# Patient Record
Sex: FEMALE | Race: WHITE | URBAN - METROPOLITAN AREA
[De-identification: names, ages, dates, MRNs, and addresses within clinical notes are randomized per-mention and may not be internally consistent; named-entity substitution may affect disease eponyms.]

---

## 2023-08-07 ENCOUNTER — OFFICE VISIT (OUTPATIENT)
Dept: INTERNAL MEDICINE CLINIC | Facility: CLINIC | Age: 58
End: 2023-08-07

## 2023-08-07 VITALS
SYSTOLIC BLOOD PRESSURE: 109 MMHG | DIASTOLIC BLOOD PRESSURE: 70 MMHG | WEIGHT: 216 LBS | BODY MASS INDEX: 37.8 KG/M2 | HEIGHT: 63.3 IN | HEART RATE: 71 BPM

## 2023-08-07 DIAGNOSIS — E11.9 TYPE 2 DIABETES MELLITUS WITHOUT COMPLICATION, WITHOUT LONG-TERM CURRENT USE OF INSULIN (HCC): ICD-10-CM

## 2023-08-07 DIAGNOSIS — E78.2 MIXED HYPERLIPIDEMIA: ICD-10-CM

## 2023-08-07 DIAGNOSIS — I10 PRIMARY HYPERTENSION: Primary | ICD-10-CM

## 2023-08-07 PROBLEM — E78.5 DYSLIPIDEMIA: Status: ACTIVE | Noted: 2020-10-16

## 2023-08-07 PROCEDURE — 3008F BODY MASS INDEX DOCD: CPT | Performed by: INTERNAL MEDICINE

## 2023-08-07 PROCEDURE — 99204 OFFICE O/P NEW MOD 45 MIN: CPT | Performed by: INTERNAL MEDICINE

## 2023-08-07 PROCEDURE — 3078F DIAST BP <80 MM HG: CPT | Performed by: INTERNAL MEDICINE

## 2023-08-07 PROCEDURE — 3074F SYST BP LT 130 MM HG: CPT | Performed by: INTERNAL MEDICINE

## 2023-08-07 RX ORDER — EMPAGLIFLOZIN 25 MG/1
TABLET, FILM COATED ORAL
COMMUNITY
Start: 2022-08-10

## 2023-08-07 RX ORDER — VALSARTAN AND HYDROCHLOROTHIAZIDE 80; 12.5 MG/1; MG/1
1 TABLET, FILM COATED ORAL DAILY
COMMUNITY
Start: 2023-05-30

## 2023-08-07 RX ORDER — GLIMEPIRIDE 4 MG/1
TABLET ORAL
COMMUNITY
Start: 2022-07-25 | End: 2023-08-07

## 2023-08-07 RX ORDER — DULAGLUTIDE 1.5 MG/.5ML
1.5 INJECTION, SOLUTION SUBCUTANEOUS WEEKLY
COMMUNITY
Start: 2023-05-15 | End: 2023-08-07

## 2023-08-07 RX ORDER — ROSUVASTATIN CALCIUM 20 MG/1
20 TABLET, COATED ORAL NIGHTLY
COMMUNITY

## 2023-08-08 ENCOUNTER — LAB ENCOUNTER (OUTPATIENT)
Dept: LAB | Age: 58
End: 2023-08-08
Attending: INTERNAL MEDICINE
Payer: COMMERCIAL

## 2023-08-08 DIAGNOSIS — I10 PRIMARY HYPERTENSION: ICD-10-CM

## 2023-08-08 DIAGNOSIS — E78.2 MIXED HYPERLIPIDEMIA: ICD-10-CM

## 2023-08-08 DIAGNOSIS — E11.9 TYPE 2 DIABETES MELLITUS WITHOUT COMPLICATION, WITHOUT LONG-TERM CURRENT USE OF INSULIN (HCC): ICD-10-CM

## 2023-08-08 LAB
ALBUMIN SERPL-MCNC: 4 G/DL (ref 3.4–5)
ALBUMIN/GLOB SERPL: 1.2 {RATIO} (ref 1–2)
ALP LIVER SERPL-CCNC: 79 U/L
ALT SERPL-CCNC: 33 U/L
ANION GAP SERPL CALC-SCNC: 11 MMOL/L (ref 0–18)
AST SERPL-CCNC: 13 U/L (ref 15–37)
BILIRUB SERPL-MCNC: 0.6 MG/DL (ref 0.1–2)
BUN BLD-MCNC: 19 MG/DL (ref 7–18)
BUN/CREAT SERPL: 22.6 (ref 10–20)
CALCIUM BLD-MCNC: 9.6 MG/DL (ref 8.5–10.1)
CHLORIDE SERPL-SCNC: 104 MMOL/L (ref 98–112)
CHOLEST SERPL-MCNC: 157 MG/DL (ref ?–200)
CO2 SERPL-SCNC: 22 MMOL/L (ref 21–32)
CREAT BLD-MCNC: 0.84 MG/DL
CREAT UR-SCNC: 24.9 MG/DL
DEPRECATED RDW RBC AUTO: 44.8 FL (ref 35.1–46.3)
EGFRCR SERPLBLD CKD-EPI 2021: 80 ML/MIN/1.73M2 (ref 60–?)
ERYTHROCYTE [DISTWIDTH] IN BLOOD BY AUTOMATED COUNT: 14.1 % (ref 11–15)
EST. AVERAGE GLUCOSE BLD GHB EST-MCNC: 189 MG/DL (ref 68–126)
FASTING PATIENT LIPID ANSWER: YES
FASTING STATUS PATIENT QL REPORTED: YES
GLOBULIN PLAS-MCNC: 3.3 G/DL (ref 2.8–4.4)
GLUCOSE BLD-MCNC: 218 MG/DL (ref 70–99)
HBA1C MFR BLD: 8.2 % (ref ?–5.7)
HCT VFR BLD AUTO: 44.3 %
HDLC SERPL-MCNC: 55 MG/DL (ref 40–59)
HGB BLD-MCNC: 15.3 G/DL
LDLC SERPL CALC-MCNC: 65 MG/DL (ref ?–100)
MCH RBC QN AUTO: 30.3 PG (ref 26–34)
MCHC RBC AUTO-ENTMCNC: 34.5 G/DL (ref 31–37)
MCV RBC AUTO: 87.7 FL
MICROALBUMIN UR-MCNC: 0.79 MG/DL
MICROALBUMIN/CREAT 24H UR-RTO: 31.7 UG/MG (ref ?–30)
NONHDLC SERPL-MCNC: 102 MG/DL (ref ?–130)
OSMOLALITY SERPL CALC.SUM OF ELEC: 293 MOSM/KG (ref 275–295)
PLATELET # BLD AUTO: 189 10(3)UL (ref 150–450)
POTASSIUM SERPL-SCNC: 4 MMOL/L (ref 3.5–5.1)
PROT SERPL-MCNC: 7.3 G/DL (ref 6.4–8.2)
RBC # BLD AUTO: 5.05 X10(6)UL
SODIUM SERPL-SCNC: 137 MMOL/L (ref 136–145)
TRIGL SERPL-MCNC: 230 MG/DL (ref 30–149)
TSI SER-ACNC: 1.4 MIU/ML (ref 0.36–3.74)
VLDLC SERPL CALC-MCNC: 35 MG/DL (ref 0–30)
WBC # BLD AUTO: 6 X10(3) UL (ref 4–11)

## 2023-08-08 PROCEDURE — 82570 ASSAY OF URINE CREATININE: CPT

## 2023-08-08 PROCEDURE — 80061 LIPID PANEL: CPT

## 2023-08-08 PROCEDURE — 84443 ASSAY THYROID STIM HORMONE: CPT

## 2023-08-08 PROCEDURE — 83036 HEMOGLOBIN GLYCOSYLATED A1C: CPT

## 2023-08-08 PROCEDURE — 80053 COMPREHEN METABOLIC PANEL: CPT

## 2023-08-08 PROCEDURE — 3060F POS MICROALBUMINURIA REV: CPT | Performed by: INTERNAL MEDICINE

## 2023-08-08 PROCEDURE — 36415 COLL VENOUS BLD VENIPUNCTURE: CPT

## 2023-08-08 PROCEDURE — 85027 COMPLETE CBC AUTOMATED: CPT

## 2023-08-08 PROCEDURE — 3052F HG A1C>EQUAL 8.0%<EQUAL 9.0%: CPT | Performed by: INTERNAL MEDICINE

## 2023-08-08 PROCEDURE — 82043 UR ALBUMIN QUANTITATIVE: CPT

## 2023-08-08 PROCEDURE — 3061F NEG MICROALBUMINURIA REV: CPT | Performed by: INTERNAL MEDICINE

## 2023-08-10 DIAGNOSIS — E11.9 TYPE 2 DIABETES MELLITUS WITHOUT COMPLICATION, WITHOUT LONG-TERM CURRENT USE OF INSULIN (HCC): Primary | ICD-10-CM

## 2023-10-18 ENCOUNTER — OFFICE VISIT (OUTPATIENT)
Dept: INTERNAL MEDICINE CLINIC | Facility: CLINIC | Age: 58
End: 2023-10-18

## 2023-10-18 VITALS
SYSTOLIC BLOOD PRESSURE: 103 MMHG | HEART RATE: 77 BPM | HEIGHT: 63.3 IN | DIASTOLIC BLOOD PRESSURE: 69 MMHG | RESPIRATION RATE: 18 BRPM | WEIGHT: 212.38 LBS | BODY MASS INDEX: 37.16 KG/M2

## 2023-10-18 DIAGNOSIS — Z12.31 SCREENING MAMMOGRAM, ENCOUNTER FOR: ICD-10-CM

## 2023-10-18 DIAGNOSIS — E11.9 DIABETES MELLITUS TYPE 2, NONINSULIN DEPENDENT (HCC): ICD-10-CM

## 2023-10-18 DIAGNOSIS — Z00.00 PHYSICAL EXAM, ANNUAL: Primary | ICD-10-CM

## 2023-10-18 PROCEDURE — 3078F DIAST BP <80 MM HG: CPT | Performed by: INTERNAL MEDICINE

## 2023-10-18 PROCEDURE — 99396 PREV VISIT EST AGE 40-64: CPT | Performed by: INTERNAL MEDICINE

## 2023-10-18 PROCEDURE — 3074F SYST BP LT 130 MM HG: CPT | Performed by: INTERNAL MEDICINE

## 2023-10-18 PROCEDURE — 3008F BODY MASS INDEX DOCD: CPT | Performed by: INTERNAL MEDICINE

## 2023-10-24 RX ORDER — EMPAGLIFLOZIN 25 MG/1
25 TABLET, FILM COATED ORAL DAILY
Qty: 90 TABLET | Refills: 1 | Status: SHIPPED | OUTPATIENT
Start: 2023-10-24

## 2023-10-24 NOTE — TELEPHONE ENCOUNTER
Please review; protocol failed. Requested Prescriptions   Pending Prescriptions Disp Refills    JARDIANCE 25 MG Oral Tab 90 tablet 1     Sig: Take 25 mg by mouth daily.        Diabetes Medication Protocol Failed - 10/23/2023  3:15 PM        Failed - Last A1C < 7.5 and within past 6 months     Lab Results   Component Value Date    A1C 8.2 (H) 08/08/2023             Passed - In person appointment or virtual visit in the past 6 mos or appointment in next 3 mos     Recent Outpatient Visits              6 days ago Physical exam, annual    Reji Delgado MD    Office Visit    2 months ago Primary hypertension    Ana Shell MD    Office Visit          Future Appointments         Provider Department Appt Notes    In 3 months [facility] [address] [facility]     [facility]                      Passed - EGFRCR or GFRNAA > 50     GFR Evaluation  EGFRCR: 80 , resulted on 8/8/2023          Passed - GFR in the past 12 months           Recent Outpatient Visits              6 days ago Physical exam, annual    Ana Shell MD    Office Visit    2 months ago Primary hypertension    Ana Shell MD    Office Visit          Future Appointments         Provider Department Appt Notes    In 3 months ADO [facility] [address] [facility]     In 3 months MD Vincenzo Cyr[address] F/U WITH PAP

## 2023-11-02 ENCOUNTER — TELEPHONE (OUTPATIENT)
Dept: INTERNAL MEDICINE CLINIC | Facility: CLINIC | Age: 58
End: 2023-11-02

## 2023-11-02 DIAGNOSIS — E11.9 TYPE 2 DIABETES MELLITUS WITHOUT COMPLICATION, WITHOUT LONG-TERM CURRENT USE OF INSULIN (HCC): ICD-10-CM

## 2023-11-03 DIAGNOSIS — E11.9 TYPE 2 DIABETES MELLITUS WITHOUT COMPLICATION, WITHOUT LONG-TERM CURRENT USE OF INSULIN (HCC): ICD-10-CM

## 2023-11-03 NOTE — TELEPHONE ENCOUNTER
Please review; protocol failed. Requested Prescriptions   Pending Prescriptions Disp Refills    SITagliptin Phosphate 100 MG Oral Tab 90 tablet 0     Sig: Take 1 tablet (100 mg total) by mouth daily.        Diabetes Medication Protocol Failed - 11/2/2023  3:41 PM        Failed - Last A1C < 7.5 and within past 6 months     Lab Results   Component Value Date    A1C 8.2 (H) 08/08/2023             Passed - In person appointment or virtual visit in the past 6 mos or appointment in next 3 mos     Recent Outpatient Visits              2 weeks ago Physical exam, annual    Sher Orellana MD    Office Visit    2 months ago Primary hypertension    Sher Orellana MD    Office Visit          Future Appointments         Provider Department Appt Notes    In 2 months ADO [address]     [facility]                      Passed - EGFRCR or GFRNAA > 50     GFR Evaluation  EGFRCR: 80 , resulted on 8/8/2023          Passed - GFR in the past 12 months           Recent Outpatient Visits              2 weeks ago Physical exam, annual    Sher Orellana MD    Office Visit    2 months ago Primary hypertension    Sher Orellana MD    Office Visit          Future Appointments         Provider Department Appt Notes    In 2 months [address]     In 2 months MD Vincenzo Yip[address] F/U WITH PAP

## 2023-11-06 NOTE — TELEPHONE ENCOUNTER
Spoke with pt and pt states Dr Mata Gerber had advised her to complete the Hemoglobin A1C in January before her OV with Dr Mata Gerber

## 2023-12-08 ENCOUNTER — MED REC SCAN ONLY (OUTPATIENT)
Dept: INTERNAL MEDICINE CLINIC | Facility: CLINIC | Age: 58
End: 2023-12-08

## 2024-01-25 ENCOUNTER — HOSPITAL ENCOUNTER (OUTPATIENT)
Dept: MAMMOGRAPHY | Age: 59
Discharge: HOME | End: 2024-01-25
Attending: INTERNAL MEDICINE
Payer: COMMERCIAL

## 2024-01-25 ENCOUNTER — OFFICE VISIT (OUTPATIENT)
Dept: INTERNAL MEDICINE CLINIC | Facility: CLINIC | Age: 59
End: 2024-01-25

## 2024-01-25 VITALS
DIASTOLIC BLOOD PRESSURE: 79 MMHG | HEART RATE: 84 BPM | RESPIRATION RATE: 20 BRPM | SYSTOLIC BLOOD PRESSURE: 118 MMHG | WEIGHT: 210 LBS | BODY MASS INDEX: 36.75 KG/M2 | HEIGHT: 63.3 IN

## 2024-01-25 DIAGNOSIS — Z12.4 SCREENING FOR CERVICAL CANCER: ICD-10-CM

## 2024-01-25 DIAGNOSIS — E11.9 TYPE 2 DIABETES MELLITUS WITHOUT COMPLICATION, WITHOUT LONG-TERM CURRENT USE OF INSULIN (HCC): ICD-10-CM

## 2024-01-25 DIAGNOSIS — E78.5 DYSLIPIDEMIA: Primary | ICD-10-CM

## 2024-01-25 DIAGNOSIS — R09.82 POSTNASAL DRIP: ICD-10-CM

## 2024-01-25 DIAGNOSIS — Z12.31 SCREENING MAMMOGRAM, ENCOUNTER FOR: ICD-10-CM

## 2024-01-25 DIAGNOSIS — I10 ESSENTIAL HYPERTENSION, BENIGN: ICD-10-CM

## 2024-01-25 LAB
CARTRIDGE LOT#: 634 NUMERIC
HEMOGLOBIN A1C: 9.6 % (ref 4.3–5.6)

## 2024-01-25 PROCEDURE — 3074F SYST BP LT 130 MM HG: CPT | Performed by: INTERNAL MEDICINE

## 2024-01-25 PROCEDURE — 83036 HEMOGLOBIN GLYCOSYLATED A1C: CPT | Performed by: INTERNAL MEDICINE

## 2024-01-25 PROCEDURE — 99214 OFFICE O/P EST MOD 30 MIN: CPT | Performed by: INTERNAL MEDICINE

## 2024-01-25 PROCEDURE — 77063 BREAST TOMOSYNTHESIS BI: CPT | Performed by: INTERNAL MEDICINE

## 2024-01-25 PROCEDURE — 3046F HEMOGLOBIN A1C LEVEL >9.0%: CPT | Performed by: INTERNAL MEDICINE

## 2024-01-25 PROCEDURE — 77067 SCR MAMMO BI INCL CAD: CPT | Performed by: INTERNAL MEDICINE

## 2024-01-25 PROCEDURE — 3008F BODY MASS INDEX DOCD: CPT | Performed by: INTERNAL MEDICINE

## 2024-01-25 PROCEDURE — 3078F DIAST BP <80 MM HG: CPT | Performed by: INTERNAL MEDICINE

## 2024-01-25 RX ORDER — EMPAGLIFLOZIN 25 MG/1
25 TABLET, FILM COATED ORAL DAILY
Qty: 90 TABLET | Refills: 1 | Status: SHIPPED | OUTPATIENT
Start: 2024-01-25

## 2024-01-25 RX ORDER — VALSARTAN AND HYDROCHLOROTHIAZIDE 80; 12.5 MG/1; MG/1
1 TABLET, FILM COATED ORAL DAILY
Qty: 90 TABLET | Refills: 1 | Status: SHIPPED | OUTPATIENT
Start: 2024-01-25

## 2024-01-25 RX ORDER — ROSUVASTATIN CALCIUM 20 MG/1
20 TABLET, COATED ORAL NIGHTLY
Qty: 90 TABLET | Refills: 1 | Status: SHIPPED | OUTPATIENT
Start: 2024-01-25

## 2024-01-25 NOTE — PROGRESS NOTES
Irish Wolf is a 58 year old female.  Chief Complaint   Patient presents with    Follow - Up    Pap       HPI:   Patient comes for follow-up  C/C follow-up  C/L noted that her blood sugars were elevated and patient is pretty surprised about this because she has been eating carefully and watching what she eats even if it was the holidays -didn't feel she did too bad   Could not tolerate metformin in the past and could not tolerate Trulicity and got a rash and shortness of breath         HISTORY  New pt - referred by michelle   C/c establish care - used to see dr lundy  C/o xould not tolerate metformin so was on glipiperide and jardiance and then trulicty was added but she was getting hypoglycemic   Then was taken off glimiperide and only trulicity and jardiance but when ever she takes the trulicity was was bleeding and bruising and some sob  so she stopped the trulicity         PMH  Dm2 - jan 2023 aic was 9 as per pt   HTN  HL  [facility] umbilical hernia repair 2022 Dr. Cristina   history of diverticulitis status post left hemicolectomy done by Dr. Cristina in October 2020        Lives with ramy , works customer [employer]        Current Outpatient Medications   Medication Sig Dispense Refill    JARDIANCE 25 MG Oral Tab Take 25 mg by mouth daily. 90 tablet 1    rosuvastatin 20 MG Oral Tab Take 1 tablet (20 mg total) by mouth nightly. 90 tablet 1    SITagliptin Phosphate 100 MG Oral Tab Take 1 tablet (100 mg total) by mouth daily. 90 tablet 1    valsartan-hydroCHLOROthiazide 80-12.5 MG Oral Tab Take 1 tablet by mouth daily. 90 tablet 1      Past Medical History:   Diagnosis Date    Diverticulitis     Hernia     HTN (hypertension)     Hyperlipidemia       Past Surgical History:   Procedure Laterality Date    Hernia surgery      Other surgical history      Colectomy with anastomosis      Social History:  Social History     Socioeconomic History    Marital status: Single   Tobacco Use    Smoking status: Former     Packs/day: 0.50      Years: 30.00     Additional pack years: 0.00     Total pack years: 15.00     Types: Cigarettes     Quit date: 10/1/2020     Years since quitting: 3.3    Smokeless tobacco: Never   Vaping Use    Vaping Use: Never used   Substance and Sexual Activity    Alcohol use: Never    Drug use: Never        REVIEW OF SYSTEMS:   GENERAL HEALTH: No fevers, chills, sweats, fatigue  VISION: No recent vision problems, blurry vision or double vision  RESPIRATORY: denies shortness of breath, cough, wheezing  CARDIOVASCULAR: denies chest pain on exertion, palpitations, swelling in feet  NEURO: denies headaches , anxiety, depression    EXAM:   /79 (BP Location: Right arm, Patient Position: Sitting, Cuff Size: large)   Pulse 84   Resp 20   Ht 5' 3.3\" (1.608 m)   Wt 210 lb (95.3 kg)   LMP  (LMP Unknown)   BMI 36.85 kg/m²   GENERAL: well developed, well nourished,in no apparent distress  SKIN: no rashes,no suspicious lesions  HEENT: atraumatic, normocephalic, ears bilaterally with normal tympanic membranes, mild cobblestoning in the back of the throat  NECK: supple,no adenopathy  LUNGS: clear to auscultation, no wheeze  CARDIO: RRR without murmur  EXTREMITIES: no cyanosis, or edema  Bilateral barefoot skin diabetic exam is normal, visualized feet and the appearance is normal.  Bilateral monofilament/sensation of both feet is normal.  Pulsation pedal pulse exam of both lower legs/feet is normal as well.    external genitalia-normal appearance, hair distribution, no lesions  Urethral meatus-normal in size, location, without lesions or prolapse  Bladder-no fullness, masses or tenderness  Vagina-normal appearance without lesions, no abnormal discharge  Cervix-normal without tenderness on motion  Uterus-normal in size, contour, position, mobility without tenderness  Adnexa-normal without masses or tenderness  Perineum normal  Rectovaginal-no masses  Anus no hemorrhoids seen       ASSESSMENT AND PLAN:   Diagnoses and all orders  for this visit:    Dyslipidemia  -     rosuvastatin 20 MG Oral Tab; Take 1 tablet (20 mg total) by mouth nightly.  Follow a low fat low chol diet and cont meds - refilled     Essential hypertension, benign  -     valsartan-hydroCHLOROthiazide 80-12.5 MG Oral Tab; Take 1 tablet by mouth daily.  Advised pt to follow a low salt , low sodium (including fast foods and processed foods), can look up DASH diet, exercise 30 min a day , monitor bp   [facility] meds   Screening for cervical cancer  -     ThinPrep PAP Smear; Future  -     Hpv Dna  High Risk , Thin Prep Collect; Future  Today     Postnasal drip  Try zertec or xyzal x 1-14 days     Type 2 diabetes mellitus without complication, without long-term current use of insulin (HCC)  -     JARDIANCE 25 MG Oral Tab; Take 25 mg by mouth daily.  -     rosuvastatin 20 MG Oral Tab; Take 1 tablet (20 mg total) by mouth nightly.  -     SITagliptin Phosphate 100 MG Oral Tab; Take 1 tablet (100 mg total) by mouth daily.  -     HEMOGLOBIN A1C  -     Endocrine Referral - In Network    bl sugars   Hba1c 8.2 on 8/2023 and   [facility] 8/2023   Eye exam - dr cheema   Consider Asa , on arb no acei, no statin   Foot - 8/7/2023  Diet -- advised to follow a low carb, low sugar diet , try to be consistent                Exercise 30 min a day            Preventative medicine  Mammogram - 1/25/2024  Pap- 202?- dr lundy   Cscope - 2020 -history of diverticulitis with colon resection and anastamosis - dr díaz at [facility] 8/2023       The patient indicates understanding of these issues and agrees to the plan.  No follow-ups on file.

## 2024-01-26 LAB — HPV I/H RISK 1 DNA SPEC QL NAA+PROBE: NEGATIVE

## 2024-02-26 ENCOUNTER — OFFICE VISIT (OUTPATIENT)
Dept: ENDOCRINOLOGY CLINIC | Facility: CLINIC | Age: 59
End: 2024-02-26

## 2024-02-26 VITALS
HEIGHT: 63 IN | BODY MASS INDEX: 38.98 KG/M2 | WEIGHT: 220 LBS | SYSTOLIC BLOOD PRESSURE: 107 MMHG | HEART RATE: 85 BPM | DIASTOLIC BLOOD PRESSURE: 81 MMHG

## 2024-02-26 DIAGNOSIS — E11.65 UNCONTROLLED TYPE 2 DIABETES MELLITUS WITH HYPERGLYCEMIA (HCC): ICD-10-CM

## 2024-02-26 DIAGNOSIS — E11.9 DIABETES MELLITUS TYPE 2, NONINSULIN DEPENDENT (HCC): Primary | ICD-10-CM

## 2024-02-26 DIAGNOSIS — E11.21 DIABETIC NEPHROPATHY ASSOCIATED WITH TYPE 2 DIABETES MELLITUS (HCC): ICD-10-CM

## 2024-02-26 DIAGNOSIS — E66.9 CLASS 2 OBESITY WITH BODY MASS INDEX (BMI) OF 38.0 TO 38.9 IN ADULT, UNSPECIFIED OBESITY TYPE, UNSPECIFIED WHETHER SERIOUS COMORBIDITY PRESENT: ICD-10-CM

## 2024-02-26 DIAGNOSIS — E78.5 DYSLIPIDEMIA: ICD-10-CM

## 2024-02-26 DIAGNOSIS — I10 ESSENTIAL HYPERTENSION, BENIGN: ICD-10-CM

## 2024-02-26 DIAGNOSIS — R73.09 HIGH GLUCOSE LEVEL: ICD-10-CM

## 2024-02-26 LAB
GLUCOSE BLOOD: 226
TEST STRIP EXPIRATION DATE: NORMAL DATE
TEST STRIP LOT #: NORMAL NUMERIC

## 2024-02-26 PROCEDURE — 99245 OFF/OP CONSLTJ NEW/EST HI 55: CPT | Performed by: INTERNAL MEDICINE

## 2024-02-26 PROCEDURE — 3079F DIAST BP 80-89 MM HG: CPT | Performed by: INTERNAL MEDICINE

## 2024-02-26 PROCEDURE — 82947 ASSAY GLUCOSE BLOOD QUANT: CPT | Performed by: INTERNAL MEDICINE

## 2024-02-26 PROCEDURE — 3008F BODY MASS INDEX DOCD: CPT | Performed by: INTERNAL MEDICINE

## 2024-02-26 PROCEDURE — 3074F SYST BP LT 130 MM HG: CPT | Performed by: INTERNAL MEDICINE

## 2024-02-26 RX ORDER — BLOOD SUGAR DIAGNOSTIC
1 STRIP MISCELLANEOUS 4 TIMES DAILY
Qty: 100 EACH | Refills: 2 | Status: SHIPPED | OUTPATIENT
Start: 2024-02-26 | End: 2024-05-26

## 2024-02-26 RX ORDER — INSULIN GLARGINE 100 [IU]/ML
25 INJECTION, SOLUTION SUBCUTANEOUS NIGHTLY
Qty: 22.5 ML | Refills: 0 | Status: SHIPPED | OUTPATIENT
Start: 2024-02-26 | End: 2024-05-26

## 2024-02-26 RX ORDER — TIRZEPATIDE 2.5 MG/.5ML
2.5 INJECTION, SOLUTION SUBCUTANEOUS
Qty: 2 ML | Refills: 3 | Status: SHIPPED | OUTPATIENT
Start: 2024-02-26

## 2024-02-26 RX ORDER — BLOOD-GLUCOSE SENSOR
1 EACH MISCELLANEOUS
Qty: 6 EACH | Refills: 1 | Status: SHIPPED | OUTPATIENT
Start: 2024-02-26 | End: 2024-05-26

## 2024-02-26 NOTE — PROGRESS NOTES
New Patient Evaluation - History and Physical    CONSULT - Reason for Visit:  uncontrolled DM with nephropathy, hyperglycemia, HTN, DLP,. .  Requesting Physician:   ..Chandrika Cam MD      CHIEF COMPLAINT:    Chief Complaint   Patient presents with    Consult     Diabetes last A1c 9.6 on 24        HISTORY OF PRESENT ILLNESS:   Irish Wolf is a 58 year old female who presents with  uncontrolled DM with nephropathy, hyperglycemia, HTN, DLP, and obesity.     Lab Results   Component Value Date    A1C 9.6 (A) 2024    A1C 8.2 (H) 2023      t2DM Dx  > 5yrs ago per Pt    Had GI SE from metformin   Had local reaction and SOB from trulicity   Has been on Januvia 100 and Jardiance 25 for a year   Was on glipizide   Tried CGM and liked them but insurance did not cover     DLP Cresto 20     HTN Valsartan/hydrochlorothiazide    On cinnamon, turmeric   Working on her diet now.   Trying to walk more       DM quality measures:  A1C/Blood pressure: as reported above   Nephropathy screenin2023  continue ace /arb rx.   LIPID screenin2023  . on statin rx.   Last dilated eye exam: Last Dilated Eye Exam: 23     Exam shows retinopathy? Eye Exam shows Diabetic Retinopathy?: No    Last diabetic foot exam: No data recorded 2024  Dentist : recommend every 6m  Neuropathy:  no  CAD/ASCVD/PAD/CVA: no     GLP-1 agonists:  No personal or family history of MEN syndrome   No personal history pancreatitis   Patient counselled regarding side effects including injection site reactions, nausea, vomiting, diarrhea, pancreatitis, gastroparesis and rare side effect eris Sergo syndrome.    SGLT2 inhibitors  No UTI or yeast infection   Discussed side effects including UTI and fungal infections.   Discussed the importance of hydration.      ASSESSMENT AND PLAN:  57 yo woman  uncontrolled DM with nephropathy, hyperglycemia, HTN, DLP, and obesity.      Will give CGM samples and send Rx  follow up with [facility] in 2 weeks for  CGM download  Will rechallenge with mounjaro low doses . Cannot take trulicity d/t side effect   Will start Mounjaro Stop Januvia  when you start moujnjaro.   Continue jardianc mg weekly  - stop if you get UTI/Yeast infection   Start lantus 20 units daily   Had GI SE from metformin - will avoid now   Labs now   Walk 180 min/week   Limit cabrs  Check blood sugar fasting, before meals and before bedtime.   If you have low blood sugar <70, take 15 grams of carb (8 oz juice or regular soda) and recheck in 15 minutes.    Follow up with podiatry and eye doctor annually.   Patients need to wear covered shoes all the time and check feet daily.   Bring your meter/BG log to the next visit.      Target A1c 6.5%  Target BG   BEFORE MEAL 100-130mg/dl  2hrs AFTER MEAL less than 180mg/dl        GLP-1 agonists:  No personal or family history of MEN syndrome   No personal history pancreatitis   Patient counselled regarding side effects including injection site reactions, nausea, vomiting, diarrhea, pancreatitis, gastroparesis and rare side effect eris Sergo syndrome.    SGLT2 inhibitors  No UTI or yeast infection   Discussed side effects including UTI and fungal infections.   Discussed the importance of hydration.      We discussed these in detail with the patient and negotiated which of numerous possible changes in the in the treatment plan that would be acceptable to them. The patient remains at ongoing is at high risk for complications related to uncontrolled diabetes and treatment.  The patient requires a great deal of self-management and support. We expect the patient's risk to be reduced with the changes to the treatment plan that we recommended today.   We reviewed a very large amount of complicated data including BG target range, basal insulin doses, meal and correction related insulin boluses, time of meal, size of meal, time of BG testing and insulin administration in relations to meals. We also reviewed self-testing  BG results if available.         PAST MEDICAL HISTORY:   Past Medical History:   Diagnosis Date    Diverticulitis     Hernia     HTN (hypertension)     Hyperlipidemia        PAST SURGICAL HISTORY:   Past Surgical History:   Procedure Laterality Date    HERNIA SURGERY      OTHER SURGICAL HISTORY      Colectomy with anastomosis       CURRENT MEDICATIONS:     Tirzepatide (MOUNJARO) 2.5 MG/0.5ML Subcutaneous Solution Pen-injector Inject 2.5 mg into the skin every 7 days. 2 mL 3    insulin glargine (LANTUS SOLOSTAR) 100 UNIT/ML Subcutaneous Solution Pen-injector Inject 25 Units into the skin nightly. 22.5 mL 0    Insulin Pen Needle (PEN NEEDLES) 32G X 6 MM Does not apply Misc 1 each 4 (four) times daily. 100 each 2    Continuous Blood Gluc Sensor (FREESTYLE AKANKSHA 3 SENSOR) Does not apply Misc 1 each every 14 (fourteen) days. 6 each 1    JARDIANCE 25 MG Oral Tab Take 25 mg by mouth daily. 90 tablet 1    rosuvastatin 20 MG Oral Tab Take 1 tablet (20 mg total) by mouth nightly. 90 tablet 1    valsartan-hydroCHLOROthiazide 80-12.5 MG Oral Tab Take 1 tablet by mouth daily. 90 tablet 1         ALLERGIES:  No Known Allergies    SOCIAL HISTORY:    Social History     Socioeconomic History    Marital status: Single   Tobacco Use    Smoking status: Former     Packs/day: 0.50     Years: 30.00     Additional pack years: 0.00     Total pack years: 15.00     Types: Cigarettes     Quit date: 10/1/2020     Years since quitting: 3.4    Smokeless tobacco: Never   Vaping Use    Vaping Use: Never used   Substance and Sexual Activity    Alcohol use: Never    Drug use: Never       FAMILY HISTORY:   Family History   Problem Relation Age of Onset    Heart Disorder Father         chf    Psychiatric Mother         parkinsons    Diabetes Mother     Dementia Mother    DM in the family ++       REVIEW OF SYSTEMS:  Hands dry   All negative other than HPI      PHYSICAL EXAM:   Height: 5' 3\" (160 cm) (02/26 0914)  Weight: 220 lb (99.8 kg) (02/26  0914)  BSA (Calculated - sq m): 2.01 sq meters (02/26 0914)  Pulse: 85 (02/26 0914)  BP: 107/81 (02/26 0914)  Temp: --  Do Not Use - Resp Rate: --  SpO2: --    Body mass index is 38.97 kg/m².  Obese   No goiter   No tremors   No abd tenderness     CONSTITUTIONAL:  Awake and alert. Age appropriate, good hygiene not in acute distress. Well nourished and well developed. no acute distress   PSYCH:   Orientated to time, place, person & situation, Normal mood and affect, memory intact, normal insight and judgment, cooperative  Neuro: speech is clear. Awake, alert, no aphasia, no facial asymmetry, no nuchal rigidity  EYES:  No proptosis, no ptosis, conjunctiva normal  ENT:  Normocephalic, atraumatic  Eye: EOMI, normal lids, no discharge, no conjunctival erythema. No exophthalmos/proptosis, Ptosis negative   No rhinorrhea, moist oral mucosa  Neck: full range of motion  Neck/Thyroid: neck inspection: normal, No scar, No goiter   LUNGS:  No acute respiratory distress, non-labored respiration. Speaking full sentences  CARDIOVASCULAR:  regular rate   ABDOMEN:  No abdominal pain.   SKIN:  no bruising or bleeding, no rashes and no lesions, Skin is dry, no obvious rashes or lesions  EXTREMITIES: no gross abnormality   MSK: Moves extremities spontaneously. full range of motion in all major joints      DATA:     Pertinent data reviewed      Latest Reference Range & Units 01/25/24 10:06   HEMOGLOBIN A1C 4.3 - 5.6 % 9.6 !   !: Data is abnormal      Latest Reference Range & Units 08/08/23 07:50   TSH 0.358 - 3.740 mIU/mL 1.400   WBC 4.0 - 11.0 x10(3) uL 6.0   Hemoglobin 12.0 - 16.0 g/dL 15.3   Hematocrit 35.0 - 48.0 % 44.3   Platelet Count 150.0 - 450.0 10(3)uL 189.0   RBC 3.80 - 5.30 x10(6)uL 5.05   MCH 26.0 - 34.0 pg 30.3   MCHC 31.0 - 37.0 g/dL 34.5   MCV 80.0 - 100.0 fL 87.7   RDW 11.0 - 15.0 % 14.1   RDW-SD 35.1 - 46.3 fL 44.8   CREATININE UR RANDOM mg/dL 24.90   MALB URINE mg/dL 0.79   MALB/CRE CALC <=30.0 ug/mg 31.7 (H)   (H):  Data is abnormally high    No results for input(s): \"TSH\", \"T4F\", \"T3F\", \"THYP\" in the last 72 hours.  No results found.        Orders Placed This Encounter   Procedures    POC HemoCue Glucose 201 (Finger stick glucose)    Microalb/Creat Ratio, Random Urine     Orders Placed This Encounter    POC HemoCue Glucose 201 (Finger stick glucose)     Order Specific Question:   Release to patient     Answer:   Immediate    Microalb/Creat Ratio, Random Urine     Standing Status:   Future     Standing Expiration Date:   2025     Order Specific Question:   Release to patient     Answer:   Immediate    Tirzepatide (MOUNJARO) 2.5 MG/0.5ML Subcutaneous Solution Pen-injector     Sig: Inject 2.5 mg into the skin every 7 days.     Dispense:  2 mL     Refill:  3    insulin glargine (LANTUS SOLOSTAR) 100 UNIT/ML Subcutaneous Solution Pen-injector     Sig: Inject 25 Units into the skin nightly.     Dispense:  22.5 mL     Refill:  0    Insulin Pen Needle (PEN NEEDLES) 32G X 6 MM Does not apply Misc     Si each 4 (four) times daily.     Dispense:  100 each     Refill:  2    Continuous Blood Gluc Sensor (FREESTYLE AKANKSHA 3 SENSOR) Does not apply Misc     Si each every 14 (fourteen) days.     Dispense:  6 each     Refill:  1          This is a specialized patient consultation in endocrinology and required comprehensive review of prior records, as well as current evaluation, with time required for consideration of complex endocrine issues and consultation. For this visit, I personally interviewed the patient, and family member if accompanied, performed the pertinent parts of the history and physical examination. ROS included screening for appropriate endocrine conditions.   Today's diagnosis and plan were reviewed in detail with the patient who states understanding and agrees with plan. I discussed with the patient possible diagnosis, differential diagnosis, need for work up , treatment options, alternatives and side effects.      Please see note for details about time spent which includes:   · pre-visit preparation  · reviewing records  · face to face time with the patient   · timely documentation of the encounter  · ordering medications/tests  · communication with care team  · care coordination    I appreciate the opportunity to be part of your patient's medical care and will keep you, as the referring and primary physicians, informed about the care of your patient, including possible future surgery and pathology findings. Please feel free to contact me should you have any questions.      Melissa Rangel MD

## 2024-02-26 NOTE — PATIENT INSTRUCTIONS
Will give CGM samples and send Rx  [facility]  Will rechallenge with mounjaro low doses . Cannot take trulicity d/t side effect   Will start Mounjaro Stop Januvia  when you start moujnjaro.   Continue jardianc mg weekly  - stop if you get UTI/Yeast infection   Start lantus 20 units daily   Had GI SE from metformin - will avoid now   Labs now   Walk 180 min/week   Limit cabrs    Check blood sugar fasting, before meals and before bedtime.   If you have low blood sugar <70, take 15 grams of carb (8 oz juice or regular soda) and recheck in 15 minutes.    Follow up with podiatry and eye doctor annually.   Patients need to wear covered shoes all the time and check feet daily.   Bring your meter/BG log to the next visit.      Target A1c 6.5%  Target BG   BEFORE MEAL 100-130mg/dl  2hrs AFTER MEAL less than 180mg/dl

## 2024-03-04 ENCOUNTER — TELEPHONE (OUTPATIENT)
Dept: ENDOCRINOLOGY CLINIC | Facility: CLINIC | Age: 59
End: 2024-03-04

## 2024-03-04 NOTE — TELEPHONE ENCOUNTER
Plan does not cover this medication. Please call plan at [phone] to neftaly CORDOVA or call pharmacy to change medication. Pt ID# is [id number]    Tirzepatide (MOUNJARO) 2.5 MG/0.5ML Subcutaneous Solution Pen-injector, Inject 2.5 mg into the skin every 7 days., Disp: 2 mL, Rfl: 3

## 2024-03-06 NOTE — TELEPHONE ENCOUNTER
Medication PA Requested:  Mounjaro 2.5mg/0.5mL                                                        CoverMyMeds Used:  Key:  Quantity: 2mL  Day Supply: 30  Sig:  Inject 2.5 mg into the skin every 7 days.   DX Code:  E11.9

## 2024-03-08 ENCOUNTER — TELEPHONE (OUTPATIENT)
Facility: CLINIC | Age: 59
End: 2024-03-08

## 2024-03-08 DIAGNOSIS — E11.65 TYPE 2 DIABETES MELLITUS WITH HYPERGLYCEMIA, UNSPECIFIED WHETHER LONG TERM INSULIN USE (HCC): Primary | ICD-10-CM

## 2024-03-08 NOTE — TELEPHONE ENCOUNTER
Medication PA Requested:  Mounjaro 2.5mg/0.5mL                                                        CoverMyMeds Used:No  Key:  Quantity: 2mL  Day Supply: 30  Sig:  Inject 2.5 mg into the skin every 7 days.   DX Code:  E11.9       EPA submitted, LOV 2/26 and A1C 1/25  Awaiting determination

## 2024-03-11 ENCOUNTER — ORDER TRANSCRIPTION (OUTPATIENT)
Dept: ADMINISTRATIVE | Facility: HOSPITAL | Age: 59
End: 2024-03-11

## 2024-03-11 DIAGNOSIS — E11.649: Primary | ICD-10-CM

## 2024-03-11 NOTE — TELEPHONE ENCOUNTER
Received approval letter from [employer] for the following medication:    Tirzepatide (MOUNJARO) 2.5 MG/0.5ML Subcutaneous Solution Pen-injector         [facility] sent notifying pt

## 2024-03-19 ENCOUNTER — HOSPITAL ENCOUNTER (OUTPATIENT)
Dept: ENDOCRINOLOGY | Facility: HOSPITAL | Age: 59
Discharge: HOME | End: 2024-03-19
Attending: INTERNAL MEDICINE
Payer: COMMERCIAL

## 2024-03-19 DIAGNOSIS — E11.9 DIABETES MELLITUS TYPE 2, NONINSULIN DEPENDENT (HCC): Primary | ICD-10-CM

## 2024-03-19 PROCEDURE — 97802 MEDICAL NUTRITION INDIV IN: CPT

## 2024-03-19 NOTE — PROGRESS NOTES
Medical Nutrition Therapy Assessment    Irish Wolf 11/1/1965 was seen for individual Diabetic Medical Nutrition Therapy:  initial/individual    Date: 3/19/2024   Start time 1715  End time: 1815    Assessment  Diabetes for 1-2 yrs; pt has never had diabetes education.  She has been using the Freestyle Laurita for 2 weeks.  Her glucose is in target range 71% of time.  She has glucose excursions after her coffee and cream in the morning while she is under some stress getting her day started and after some lunches and most dinners.      Recommended that pt log her prelunch and predinner BG levels along with the 2 hour post meal levels to see if certain foods or meals are raising her sugar.  Advised that she do this for 1-2 weeks.    Anthropometrics:  LMP  (LMP Unknown)     Current diabetes medications:  Jardiance  Januvia  Lantus- 25 units at 5:30pm (predinner)     Labs:  Lab Results   Component Value Date    A1C 9.6 (A) 01/25/2024    A1C 8.2 (H) 08/08/2023    CHOLEST 157 08/08/2023    LDL 65 08/08/2023    HDL 55 08/08/2023    NONHDLC 102 08/08/2023    TRIG 230 (H) 08/08/2023    BUN 19 (H) 08/08/2023    CREATSERUM 0.84 08/08/2023       SMBG at home: yes  Frequency of testing:  Laurita 3  BG results: CGM   71% in range  29% above range  0% below range      Diet Hx: (a.m.) 2 eggs, 2 toast or tortillas, butter  (mid-day) corned beef on oatmeal bread, cup of beef barley soup  (p.m.) 2 hot dogs, 2 buns, hummus, grilled vegetables  (snacks) crackers and hummus or raw veggies or 3 dates with goat cheese  (fluids) coffee, water      Physical Activity: walks 1 mile a day      Nutrition Diagnosis  Behavioral and environmental: nutrition and nutrition-related knowledge deficit      Intervention  Comprehensive Nutrition Education Provided:     [x] discussed healthy weight management, glucose management, lipid management, and BP management as related to diabetes   [x] discussed basic meal planning guidelines for diabetes regular  mealtime, limited concentrated sweets. Worked on establishing eating pattern/timing of meals and snacks    [x] discussed in depth meal planning using the healthy eating with diabetes plate method with focus on balanced macronutrient consumption, including identifying foods that are carbohydrates, lean protein, non-starchy vegetables, and heart healthy fats   [x] stressed importance of carbohydrate consistency in each meal   [x] recommended carbohydrate targets of 30-45 grams at meals and 0-20 grams at snacks   [x] educated on label reading   [x] educated on portion control; including use of measuring cups, spoons, or food scale   [x] provided suggestions for lower carb, healthy snacks   [] educated on importance of food monitoring and how to use food logs   [] discussed how to handle special occasions, dining out, and eating on the go   [] discussed menu planning, healthy food shopping, cooking tips, and need to pre prepare foods    [] educated on emotional eating and being mindful at meals   [x]  reviewed other behavior modification strategies    [x]emphasized the need for small, sustainable changes and working on SMART goals to encourage sustainable behaviors    [] instructions on how to use helpful websites or nutrition/tracking apps reviewed    [] taught to use carbohydrate to insulin ratio and insulin sensitivity factor    [] discussed barriers and overcoming barriers to best achieve meal planning goals    [] discussed Mediterranean diet/DASH diet, as appropriate, to address lipids or BP   [] reviewed renal diet components and how to incorporate this with diabetes meal planning       Monitoring  diet modification/understanding, blood sugars, hemoglobin A1c, and weight trends    Evaluation  Behavioral Goal(s) selected:   Healthy Eating and Problem-Solving    Support Plan:  The use of Diabetes Websites or Apps    Plan  Blood sugar goals: less than 130 mg/dl in the morning and less than 180 mg/dl 2 hours after  meals.  Keep glucose tabs or fast acting carbohydrates with you for treatment of lows; for blood glucose levels less than 70 mg/dl, take 15 grams of fast acting carbohydrates (3-4 glucose tabs, 4 ounces of juice, or as discussed). Retest in 15 min. If not above 70 mg/dl, retreat.   Call Christy Alcocer RD at [phone] (option 3) for problems/concerns.   No follow-ups on file.    Christy Alcocer RD

## 2024-04-05 ENCOUNTER — LAB ENCOUNTER (OUTPATIENT)
Dept: LAB | Facility: HOSPITAL | Age: 59
End: 2024-04-05
Attending: INTERNAL MEDICINE
Payer: COMMERCIAL

## 2024-04-05 ENCOUNTER — OFFICE VISIT (OUTPATIENT)
Facility: CLINIC | Age: 59
End: 2024-04-05

## 2024-04-05 VITALS
HEART RATE: 84 BPM | SYSTOLIC BLOOD PRESSURE: 112 MMHG | WEIGHT: 183 LBS | DIASTOLIC BLOOD PRESSURE: 82 MMHG | HEIGHT: 63 IN | OXYGEN SATURATION: 97 % | BODY MASS INDEX: 32.43 KG/M2

## 2024-04-05 DIAGNOSIS — E11.9 DIABETES MELLITUS TYPE 2, NONINSULIN DEPENDENT (HCC): ICD-10-CM

## 2024-04-05 DIAGNOSIS — R73.09 HIGH GLUCOSE LEVEL: ICD-10-CM

## 2024-04-05 DIAGNOSIS — E78.5 DYSLIPIDEMIA: ICD-10-CM

## 2024-04-05 DIAGNOSIS — E11.65 UNCONTROLLED TYPE 2 DIABETES MELLITUS WITH HYPERGLYCEMIA (HCC): ICD-10-CM

## 2024-04-05 DIAGNOSIS — E11.21 DIABETIC NEPHROPATHY ASSOCIATED WITH TYPE 2 DIABETES MELLITUS (HCC): ICD-10-CM

## 2024-04-05 DIAGNOSIS — E66.9 CLASS 2 OBESITY WITH BODY MASS INDEX (BMI) OF 38.0 TO 38.9 IN ADULT, UNSPECIFIED OBESITY TYPE, UNSPECIFIED WHETHER SERIOUS COMORBIDITY PRESENT: ICD-10-CM

## 2024-04-05 DIAGNOSIS — I10 ESSENTIAL HYPERTENSION, BENIGN: ICD-10-CM

## 2024-04-05 DIAGNOSIS — R73.09 HIGH GLUCOSE LEVEL: Primary | ICD-10-CM

## 2024-04-05 LAB
CREAT UR-SCNC: 20.5 MG/DL
MICROALBUMIN UR-MCNC: <0.3 MG/DL

## 2024-04-05 PROCEDURE — 95251 CONT GLUC MNTR ANALYSIS I&R: CPT | Performed by: INTERNAL MEDICINE

## 2024-04-05 PROCEDURE — 82570 ASSAY OF URINE CREATININE: CPT

## 2024-04-05 PROCEDURE — 3079F DIAST BP 80-89 MM HG: CPT | Performed by: INTERNAL MEDICINE

## 2024-04-05 PROCEDURE — 3074F SYST BP LT 130 MM HG: CPT | Performed by: INTERNAL MEDICINE

## 2024-04-05 PROCEDURE — 3008F BODY MASS INDEX DOCD: CPT | Performed by: INTERNAL MEDICINE

## 2024-04-05 PROCEDURE — 99214 OFFICE O/P EST MOD 30 MIN: CPT | Performed by: INTERNAL MEDICINE

## 2024-04-05 PROCEDURE — 82043 UR ALBUMIN QUANTITATIVE: CPT

## 2024-04-05 RX ORDER — SEMAGLUTIDE 0.68 MG/ML
0.25 INJECTION, SOLUTION SUBCUTANEOUS WEEKLY
Qty: 3 ML | Refills: 0 | Status: SHIPPED | OUTPATIENT
Start: 2024-04-05 | End: 2024-05-31

## 2024-04-05 RX ORDER — BLOOD SUGAR DIAGNOSTIC
1 STRIP MISCELLANEOUS 4 TIMES DAILY
Qty: 100 EACH | Refills: 2 | Status: SHIPPED | OUTPATIENT
Start: 2024-04-05 | End: 2024-07-04

## 2024-04-05 RX ORDER — INSULIN ASPART 100 [IU]/ML
8 INJECTION, SOLUTION INTRAVENOUS; SUBCUTANEOUS
Qty: 21.6 ML | Refills: 0 | Status: SHIPPED | OUTPATIENT
Start: 2024-04-05 | End: 2024-07-04

## 2024-04-05 NOTE — PATIENT INSTRUCTIONS
Will rechallenge with Ozempic low doses . Cannot take trulicity d/t side effect . Mounjaro is out of stock now   Will start Ozempic and stop Januvia  when you start Ozempic.   Continue jardiance 25 mg daily   - stop if you get UTI/Yeast infection   Lantus 26 units daily   Will start Novolog/humalog/Fiasp/lymjuev with meals 6 units.   Take 8 units with larger meals.   Take 3 with smaller meals.     Had GI SE from metformin - will avoid now   Labs now to check urine  Walk 180 min/week   Limit cabrs  Check blood sugar fasting, before meals and before bedtime.   If you have low blood sugar <70, take 15 grams of carb (8 oz juice or regular soda) and recheck in 15 minutes.    Follow up with podiatry and eye doctor annually.   Patients need to wear covered shoes all the time and check feet daily.   Bring your meter/BG log to the next visit.      Target A1c 6.5%  Target BG   BEFORE MEAL 100-130mg/dl  2hrs AFTER MEAL less than 180mg/dl

## 2024-04-05 NOTE — PROGRESS NOTES
Reason for Visit:  uncontrolled DM with nephropathy, hyperglycemia, HTN, DLP,. .  Requesting Physician:   ..Chandrika Cam MD      CHIEF COMPLAINT:    Chief Complaint   Patient presents with    Diabetes     Diabetes         HISTORY OF PRESENT ILLNESS:   Irish Wolf is a 58 year old female who presents with  uncontrolled DM with nephropathy, hyperglycemia, HTN, DLP, and obesity.     Lab Results   Component Value Date    A1C 9.6 (A) 2024    A1C 8.2 (H) 2023      t2DM Dx  > 5yrs ago per Pt  Could not get mounjaro .Mounjaro is out of stock now   She is on Januvia 100 and Jardiance 25   Lantus 26 units a day    Had GI SE from metformin   Had local reaction and SOB from trulicity   Was on glipizide   . 14 day CGM data showed   GMI 7.4 %  TIR 62 %  high 37 %  low %  very high 1 %  Very low   %    Discussed her readings and she is happy with the result     DLP Cresto 20     HTN Valsartan/hydrochlorothiazide    On cinnamon, turmeric   Working on her diet now.   Trying to walk more       DM quality measures:  A1C/Blood pressure: as reported above   Nephropathy screenin2023  continue ace /arb rx.   LIPID screenin2023  . on statin rx.   Last dilated eye exam: Last Dilated Eye Exam: 23     Exam shows retinopathy? Eye Exam shows Diabetic Retinopathy?: No    Last diabetic foot exam: No data recorded 2024  Dentist : recommend every 6m  Neuropathy:  no  CAD/ASCVD/PAD/CVA: no            ASSESSMENT AND PLAN:  57 yo woman  uncontrolled DM with nephropathy, hyperglycemia, HTN, DLP, and obesity.  Had CDE consult   Getting post prandial hyperglycemia still   D/w pt hypo and hyperglycemia   Will add short acting insulin now   Will try again to get GLP-1   Will titrate down insulin later     plan  Will rechallenge with Ozempic low doses . Cannot take trulicity d/t side effect .   Will start Ozempic and stop Januvia  when you start Ozempic.   Continue jardiance 25 mg daily   - stop if you get UTI/Yeast  infection   Lantus 26 units daily   Novolog with meals 6 units. Take 8 units with larger meals. Take 3 with smaller meals.     Had GI SE from metformin - will avoid now   Labs now to check urine  Walk 180 min/week   Limit cabrs  Check blood sugar fasting, before meals and before bedtime.   If you have low blood sugar <70, take 15 grams of carb (8 oz juice or regular soda) and recheck in 15 minutes.    Follow up with podiatry and eye doctor annually.   Patients need to wear covered shoes all the time and check feet daily.   Bring your meter/BG log to the next visit.      Target A1c 6.5%  Target BG   BEFORE MEAL 100-130mg/dl  2hrs AFTER MEAL less than 180mg/dl    We discussed these in detail with the patient and negotiated which of numerous possible changes in the in the treatment plan that would be acceptable to them. The patient remains at ongoing is at high risk for complications related to uncontrolled diabetes and treatment.  The patient requires a great deal of self-management and support. We expect the patient's risk to be reduced with the changes to the treatment plan that we recommended today.   We reviewed a very large amount of complicated data including BG target range, basal insulin doses, meal and correction related insulin boluses, time of meal, size of meal, time of BG testing and insulin administration in relations to meals. We also reviewed self-testing BG results if available.         PAST MEDICAL HISTORY:   Past Medical History:   Diagnosis Date    Diverticulitis     Hernia     HTN (hypertension)     Hyperlipidemia        PAST SURGICAL HISTORY:   Past Surgical History:   Procedure Laterality Date    HERNIA SURGERY      OTHER SURGICAL HISTORY      Colectomy with anastomosis       CURRENT MEDICATIONS:     semaglutide (OZEMPIC, 0.25 OR 0.5 MG/DOSE,) 2 MG/3ML Subcutaneous Solution Pen-injector Inject 0.25 mg into the skin once a week. 3 mL 0    insulin aspart (NOVOLOG FLEXPEN) 100 Units/mL Subcutaneous  Solution Pen-injector Inject 8 Units into the skin 3 (three) times daily before meals. 21.6 mL 0    Insulin Pen Needle (PEN NEEDLES) 32G X 6 MM Does not apply Misc 1 each 4 (four) times daily. 100 each 2    insulin glargine (LANTUS SOLOSTAR) 100 UNIT/ML Subcutaneous Solution Pen-injector Inject 25 Units into the skin nightly. 22.5 mL 0    Insulin Pen Needle (PEN NEEDLES) 32G X 6 MM Does not apply Misc 1 each 4 (four) times daily. 100 each 2    Continuous Blood Gluc Sensor (FREESTYLE AKANKSHA 3 SENSOR) Does not apply Misc 1 each every 14 (fourteen) days. 6 each 1    JARDIANCE 25 MG Oral Tab Take 25 mg by mouth daily. 90 tablet 1    rosuvastatin 20 MG Oral Tab Take 1 tablet (20 mg total) by mouth nightly. 90 tablet 1    valsartan-hydroCHLOROthiazide 80-12.5 MG Oral Tab Take 1 tablet by mouth daily. 90 tablet 1         ALLERGIES:  No Known Allergies    SOCIAL HISTORY:    Social History     Socioeconomic History    Marital status: Single   Tobacco Use    Smoking status: Former     Packs/day: 0.50     Years: 30.00     Additional pack years: 0.00     Total pack years: 15.00     Types: Cigarettes     Quit date: 10/1/2020     Years since quitting: 3.5    Smokeless tobacco: Never   Vaping Use    Vaping Use: Never used   Substance and Sexual Activity    Alcohol use: Never    Drug use: Never       FAMILY HISTORY:   Family History   Problem Relation Age of Onset    Heart Disorder Father         chf    Psychiatric Mother         parkinsons    Diabetes Mother     Dementia Mother    DM in the family ++           PHYSICAL EXAM:   Height: 5' 3\" (160 cm) (04/05 0726)  Weight: 183 lb (83 kg) (04/05 0726)  BSA (Calculated - sq m): 1.86 sq meters (04/05 0726)  Pulse: 84 (04/05 0726)  BP: 112/82 (04/05 0726)  Temp: --  Do Not Use - Resp Rate: --  SpO2: 97 % (04/05 0726)    Body mass index is 32.42 kg/m².  Obese   No goiter   No tremors   No abd tenderness     CONSTITUTIONAL:  Awake and alert. Age appropriate, good hygiene not in acute  distress. Well nourished and well developed. no acute distress   PSYCH:   Orientated to time, place, person & situation, Normal mood and affect, memory intact, normal insight and judgment, cooperative  Neuro: speech is clear. Awake, alert, no aphasia, no facial asymmetry, no nuchal rigidity  EYES:  No proptosis, no ptosis, conjunctiva normal  DATA:     Pertinent data reviewed        CREATININE UR RANDOM mg/dL 24.90   MALB URINE mg/dL 0.79   MALB/CRE CALC <=30.0 ug/mg 31.7 (H)         No results for input(s): \"TSH\", \"T4F\", \"T3F\", \"THYP\" in the last 72 hours.  No results found.        No orders of the defined types were placed in this encounter.    Orders Placed This Encounter    semaglutide (OZEMPIC, 0.25 OR 0.5 MG/DOSE,) 2 MG/3ML Subcutaneous Solution Pen-injector     Sig: Inject 0.25 mg into the skin once a week.     Dispense:  3 mL     Refill:  0    insulin aspart (NOVOLOG FLEXPEN) 100 Units/mL Subcutaneous Solution Pen-injector     Sig: Inject 8 Units into the skin 3 (three) times daily before meals.     Dispense:  21.6 mL     Refill:  0    Insulin Pen Needle (PEN NEEDLES) 32G X 6 MM Does not apply Misc     Si each 4 (four) times daily.     Dispense:  100 each     Refill:  2          This is a specialized patient consultation in endocrinology and required comprehensive review of prior records, as well as current evaluation, with time required for consideration of complex endocrine issues and consultation. For this visit, I personally interviewed the patient, and family member if accompanied, performed the pertinent parts of the history and physical examination. ROS included screening for appropriate endocrine conditions.   Today's diagnosis and plan were reviewed in detail with the patient who states understanding and agrees with plan. I discussed with the patient possible diagnosis, differential diagnosis, need for work up , treatment options, alternatives and side effects.     Please see note for details  about time spent which includes:   · pre-visit preparation  · reviewing records  · face to face time with the patient   · timely documentation of the encounter  · ordering medications/tests  · communication with care team  · care coordination    I appreciate the opportunity to be part of your patient's medical care and will keep you, as the referring and primary physicians, informed about the care of your patient, including possible future surgery and pathology findings. Please feel free to contact me should you have any questions.      Melissa Rangel MD

## 2024-05-10 ENCOUNTER — OFFICE VISIT (OUTPATIENT)
Facility: CLINIC | Age: 59
End: 2024-05-10
Payer: COMMERCIAL

## 2024-05-10 VITALS
SYSTOLIC BLOOD PRESSURE: 128 MMHG | DIASTOLIC BLOOD PRESSURE: 78 MMHG | HEIGHT: 63 IN | OXYGEN SATURATION: 96 % | BODY MASS INDEX: 38.8 KG/M2 | HEART RATE: 71 BPM | WEIGHT: 219 LBS

## 2024-05-10 DIAGNOSIS — E11.9 DIABETES MELLITUS TYPE 2, NONINSULIN DEPENDENT (HCC): ICD-10-CM

## 2024-05-10 DIAGNOSIS — I10 ESSENTIAL HYPERTENSION, BENIGN: ICD-10-CM

## 2024-05-10 DIAGNOSIS — E11.65 UNCONTROLLED TYPE 2 DIABETES MELLITUS WITH HYPERGLYCEMIA (HCC): Primary | ICD-10-CM

## 2024-05-10 DIAGNOSIS — E11.21 DIABETIC NEPHROPATHY ASSOCIATED WITH TYPE 2 DIABETES MELLITUS (HCC): ICD-10-CM

## 2024-05-10 DIAGNOSIS — E66.9 CLASS 2 OBESITY WITH BODY MASS INDEX (BMI) OF 38.0 TO 38.9 IN ADULT, UNSPECIFIED OBESITY TYPE, UNSPECIFIED WHETHER SERIOUS COMORBIDITY PRESENT: ICD-10-CM

## 2024-05-10 DIAGNOSIS — E78.5 DYSLIPIDEMIA: ICD-10-CM

## 2024-05-10 DIAGNOSIS — E11.9 TYPE 2 DIABETES MELLITUS WITHOUT COMPLICATION, WITHOUT LONG-TERM CURRENT USE OF INSULIN (HCC): ICD-10-CM

## 2024-05-10 DIAGNOSIS — R73.09 HIGH GLUCOSE LEVEL: ICD-10-CM

## 2024-05-10 LAB
CARTRIDGE EXPIRATION DATE: ABNORMAL DATE
CARTRIDGE LOT#: ABNORMAL NUMERIC
HEMOGLOBIN A1C: 8.5 % (ref 4.3–5.6)

## 2024-05-10 PROCEDURE — 3078F DIAST BP <80 MM HG: CPT | Performed by: INTERNAL MEDICINE

## 2024-05-10 PROCEDURE — 3008F BODY MASS INDEX DOCD: CPT | Performed by: INTERNAL MEDICINE

## 2024-05-10 PROCEDURE — 99214 OFFICE O/P EST MOD 30 MIN: CPT | Performed by: INTERNAL MEDICINE

## 2024-05-10 PROCEDURE — 3052F HG A1C>EQUAL 8.0%<EQUAL 9.0%: CPT | Performed by: INTERNAL MEDICINE

## 2024-05-10 PROCEDURE — 3061F NEG MICROALBUMINURIA REV: CPT | Performed by: INTERNAL MEDICINE

## 2024-05-10 PROCEDURE — 95251 CONT GLUC MNTR ANALYSIS I&R: CPT | Performed by: INTERNAL MEDICINE

## 2024-05-10 PROCEDURE — 3074F SYST BP LT 130 MM HG: CPT | Performed by: INTERNAL MEDICINE

## 2024-05-10 PROCEDURE — 83036 HEMOGLOBIN GLYCOSYLATED A1C: CPT | Performed by: INTERNAL MEDICINE

## 2024-05-10 RX ORDER — INSULIN GLARGINE 100 [IU]/ML
25 INJECTION, SOLUTION SUBCUTANEOUS NIGHTLY
Qty: 22.5 ML | Refills: 0 | Status: SHIPPED | OUTPATIENT
Start: 2024-05-10 | End: 2024-08-08

## 2024-05-10 RX ORDER — ROSUVASTATIN CALCIUM 20 MG/1
20 TABLET, COATED ORAL NIGHTLY
Qty: 90 TABLET | Refills: 1 | Status: SHIPPED | OUTPATIENT
Start: 2024-05-10

## 2024-05-10 RX ORDER — EMPAGLIFLOZIN 25 MG/1
25 TABLET, FILM COATED ORAL DAILY
Qty: 90 TABLET | Refills: 1 | Status: SHIPPED | OUTPATIENT
Start: 2024-05-10

## 2024-05-10 RX ORDER — SEMAGLUTIDE 0.68 MG/ML
0.5 INJECTION, SOLUTION SUBCUTANEOUS WEEKLY
Qty: 9 ML | Refills: 0 | Status: SHIPPED | OUTPATIENT
Start: 2024-05-10 | End: 2024-08-02

## 2024-05-10 RX ORDER — INSULIN ASPART 100 [IU]/ML
8 INJECTION, SOLUTION INTRAVENOUS; SUBCUTANEOUS
Qty: 21.6 ML | Refills: 0 | Status: SHIPPED | OUTPATIENT
Start: 2024-05-10 | End: 2024-08-08

## 2024-05-10 RX ORDER — VALSARTAN AND HYDROCHLOROTHIAZIDE 80; 12.5 MG/1; MG/1
1 TABLET, FILM COATED ORAL DAILY
Qty: 90 TABLET | Refills: 1 | Status: SHIPPED | OUTPATIENT
Start: 2024-05-10

## 2024-05-10 RX ORDER — BLOOD SUGAR DIAGNOSTIC
1 STRIP MISCELLANEOUS 4 TIMES DAILY
Qty: 100 EACH | Refills: 2 | Status: SHIPPED | OUTPATIENT
Start: 2024-05-10 | End: 2024-08-08

## 2024-05-10 NOTE — PROGRESS NOTES
Reason for Visit:  uncontrolled DM with nephropathy, hyperglycemia, HTN, DLP,.     Requesting Physician:   ..Chandrika Cam MD      CHIEF COMPLAINT:    Chief Complaint   Patient presents with    Diabetes     Last A1c 9.6        HISTORY OF PRESENT ILLNESS:   Irish Wolf is a 58 year old female who presents with  uncontrolled DM with nephropathy, hyperglycemia, HTN, DLP, and obesity.       Has nurses in the family  and asked why not on short acting insulin. I discussed with the pt novolog is short acting           t2DM Dx  > 5yrs ago per Pt  Could not get mounjaro    She is on   Ozempic 0.25 mg /week   Jardiance 25 mg daily    Lantus 26 units daily   Novolog with meals 8 units with larger meals B and L . Not with dinner         Had GI SE from metformin   Had local reaction and SOB from trulicity   Was on glipizide     . 14 day CGM data showed   GMI 7.2 %  TIR 72 %  high 26  %  low %  very high 1 %  Very low   %    Discussed her readings  DLP Cresto 20   HTN Valsartan/hydrochlorothiazide    Working on her diet now.   Trying to walk more       DM quality measures:  A1C/Blood pressure: as reported above   Nephropathy screenin2023  continue ace /arb rx.   LIPID screenin2023  . on statin rx.   Last dilated eye exam: Last Dilated Eye Exam: 23     Exam shows retinopathy? Eye Exam shows Diabetic Retinopathy?: No    Last diabetic foot exam: No data recorded 2024  Dentist : recommend every 6m  Neuropathy:  no  CAD/ASCVD/PAD/CVA: no   Cholesterol Meds: rosuvastatin Tabs - 20 MG   Cholesterol: 157, done on 2023.  HDL Cholesterol: 55, done on 2023.  LDL Cholesterol: 65, done on 2023.  TriGlycerides 230, done on 2023.    Wt Readings from Last 6 Encounters:   05/10/24 219 lb (99.3 kg)   24 183 lb (83 kg)   24 220 lb (99.8 kg)   24 210 lb (95.3 kg)   10/18/23 212 lb 6.4 oz (96.3 kg)   23 216 lb (98 kg)            ASSESSMENT AND PLAN:  58 year old  woman  uncontrolled DM with  nephropathy, hyperglycemia, HTN, DLP, and obesity.  Had CDE consult   Getting post prandial hyperglycemia still   Gained wt   D/w pt hypo and hyperglycemia   Will titrate up GLP-1   Will titrate down insulin later     plan  Will increase Ozempic 0.25 mg to 0.5,/week   Jardiance 25 mg daily   - stop if you get UTI/Yeast infection   Lantus 26 units daily   Novolog with meals 8 units with larger meals. Take 3 with smaller meals.     Rx for valsartan /hydrochlorothiazide and Rosuvastatin     Walk 180 min/week   Limit cabrs  Check blood sugar fasting, before meals and before bedtime.   If you have low blood sugar <70, take 15 grams of carb (8 oz juice or regular soda) and recheck in 15 minutes.    Follow up with podiatry and eye doctor annually.   Patients need to wear covered shoes all the time and check feet daily.   Bring your meter/BG log to the next visit.      Target A1c 6.5%  Target BG   BEFORE MEAL 100-130mg/dl  2hrs AFTER MEAL less than 180mg/dl    We discussed these in detail with the patient and negotiated which of numerous possible changes in the in the treatment plan that would be acceptable to them. The patient remains at ongoing is at high risk for complications related to uncontrolled diabetes and treatment.  The patient requires a great deal of self-management and support. We expect the patient's risk to be reduced with the changes to the treatment plan that we recommended today.   We reviewed a very large amount of complicated data including BG target range, basal insulin doses, meal and correction related insulin boluses, time of meal, size of meal, time of BG testing and insulin administration in relations to meals. We also reviewed self-testing BG results if available.         PAST MEDICAL HISTORY:   Past Medical History:    Diverticulitis    Hernia    HTN (hypertension)    Hyperlipidemia       PAST SURGICAL HISTORY:   Past Surgical History:   Procedure Laterality Date    Hernia surgery      Other  surgical history      Colectomy with anastomosis       CURRENT MEDICATIONS:     semaglutide (OZEMPIC, 0.25 OR 0.5 MG/DOSE,) 2 MG/3ML Subcutaneous Solution Pen-injector Inject 0.5 mg into the skin once a week. 9 mL 0    insulin glargine (LANTUS SOLOSTAR) 100 UNIT/ML Subcutaneous Solution Pen-injector Inject 25 Units into the skin nightly. 22.5 mL 0    Insulin Pen Needle (PEN NEEDLES) 32G X 6 MM Does not apply Misc 1 each 4 (four) times daily. 100 each 2    Insulin Pen Needle (PEN NEEDLES) 32G X 6 MM Does not apply Misc 1 each 4 (four) times daily. 100 each 2    JARDIANCE 25 MG Oral Tab Take 25 mg by mouth daily. 90 tablet 1    insulin aspart (NOVOLOG FLEXPEN) 100 Units/mL Subcutaneous Solution Pen-injector Inject 8 Units into the skin 3 (three) times daily before meals. 21.6 mL 0    rosuvastatin 20 MG Oral Tab Take 1 tablet (20 mg total) by mouth nightly. 90 tablet 1    valsartan-hydroCHLOROthiazide 80-12.5 MG Oral Tab Take 1 tablet by mouth daily. 90 tablet 1    Continuous Blood Gluc Sensor (FREESTYLE AKANKSHA 3 SENSOR) Does not apply Misc 1 each every 14 (fourteen) days. 6 each 1         ALLERGIES:  No Known Allergies    SOCIAL HISTORY:    Social History     Socioeconomic History    Marital status: Single   Tobacco Use    Smoking status: Former     Current packs/day: 0.00     Average packs/day: 0.5 packs/day for 30.0 years (15.0 ttl pk-yrs)     Types: Cigarettes     Start date: 10/1/1990     Quit date: 10/1/2020     Years since quitting: 3.6    Smokeless tobacco: Never   Vaping Use    Vaping status: Never Used   Substance and Sexual Activity    Alcohol use: Never    Drug use: Never       FAMILY HISTORY:   Family History   Problem Relation Age of Onset    Heart Disorder Father         chf    Psychiatric Mother         parkinsons    Diabetes Mother     Dementia Mother    DM in the family ++           PHYSICAL EXAM:   Height: 5' 3\" (160 cm) (05/10 0733)  Weight: 219 lb (99.3 kg) (05/10 0733)  BSA (Calculated - sq m):  2.01 sq meters (05/10 0733)  Pulse: 71 (05/10 0733)  BP: 128/78 (05/10 0733)  Temp: --  Do Not Use - Resp Rate: --  SpO2: 96 % (05/10 0733)    Body mass index is 38.79 kg/m².  Obese        CONSTITUTIONAL:  Awake and alert. Age appropriate, good hygiene not in acute distress. Well nourished and well developed. no acute distress   PSYCH:   Orientated to time, place, person & situation, Normal mood and affect, memory intact, normal insight and judgment, cooperative  Neuro: speech is clear. Awake, alert, no aphasia, no facial asymmetry, no nuchal rigidity  EYES:  No proptosis, no ptosis, conjunctiva normal  DATA:     Pertinent data reviewed        CREATININE UR RANDOM mg/dL 24.90   MALB URINE mg/dL 0.79   MALB/CRE CALC <=30.0 ug/mg 31.7 (H)         No results for input(s): \"TSH\", \"T4F\", \"T3F\", \"THYP\" in the last 72 hours.  No results found.    POC Glycohemoglobin [37666]        Component Value Flag Ref Range Units Status    HEMOGLOBIN A1C 8.5      4.3 - 5.6 % Final    Cartridge Lot# [id number]       Numeric Corrected    Cartridge Expiration Date        Date Corrected                    Orders Placed This Encounter   Procedures    POC Glycohemoglobin [89554]     Orders Placed This Encounter    POC Glycohemoglobin [28047]     Order Specific Question:   Release to patient     Answer:   Immediate    semaglutide (OZEMPIC, 0.25 OR 0.5 MG/DOSE,) 2 MG/3ML Subcutaneous Solution Pen-injector     Sig: Inject 0.5 mg into the skin once a week.     Dispense:  9 mL     Refill:  0    insulin glargine (LANTUS SOLOSTAR) 100 UNIT/ML Subcutaneous Solution Pen-injector     Sig: Inject 25 Units into the skin nightly.     Dispense:  22.5 mL     Refill:  0    Insulin Pen Needle (PEN NEEDLES) 32G X 6 MM Does not apply Misc     Si each 4 (four) times daily.     Dispense:  100 each     Refill:  2    Insulin Pen Needle (PEN NEEDLES) 32G X 6 MM Does not apply Misc     Si each 4 (four) times daily.     Dispense:  100 each     Refill:  2     JARDIANCE 25 MG Oral Tab     Sig: Take 25 mg by mouth daily.     Dispense:  90 tablet     Refill:  1    insulin aspart (NOVOLOG FLEXPEN) 100 Units/mL Subcutaneous Solution Pen-injector     Sig: Inject 8 Units into the skin 3 (three) times daily before meals.     Dispense:  21.6 mL     Refill:  0     Patient will also need insulin pen needles    rosuvastatin 20 MG Oral Tab     Sig: Take 1 tablet (20 mg total) by mouth nightly.     Dispense:  90 tablet     Refill:  1    valsartan-hydroCHLOROthiazide 80-12.5 MG Oral Tab     Sig: Take 1 tablet by mouth daily.     Dispense:  90 tablet     Refill:  1          This is a specialized patient consultation in endocrinology and required comprehensive review of prior records, as well as current evaluation, with time required for consideration of complex endocrine issues and consultation. For this visit, I personally interviewed the patient, and family member if accompanied, performed the pertinent parts of the history and physical examination. ROS included screening for appropriate endocrine conditions.   Today's diagnosis and plan were reviewed in detail with the patient who states understanding and agrees with plan. I discussed with the patient possible diagnosis, differential diagnosis, need for work up , treatment options, alternatives and side effects.     Please see note for details about time spent which includes:   · pre-visit preparation  · reviewing records  · face to face time with the patient   · timely documentation of the encounter  · ordering medications/tests  · communication with care team  · care coordination    I appreciate the opportunity to be part of your patient's medical care and will keep you, as the referring and primary physicians, informed about the care of your patient, including possible future surgery and pathology findings. Please feel free to contact me should you have any questions.      Melissa Rangel MD

## 2024-05-10 NOTE — PATIENT INSTRUCTIONS
RTC in 1 mo     Will increase Ozempic 0.25 mg to 0.5mg/week   Jardiance 25 mg daily   - stop if you get UTI/Yeast infection   Lantus 26 units daily   Novolog with meals 8 units with larger meals. Take 3 with smaller meals.     valsartan /hydrochlorothiazide    Rosuvastatin

## 2024-05-15 DIAGNOSIS — E66.9 CLASS 2 OBESITY WITH BODY MASS INDEX (BMI) OF 38.0 TO 38.9 IN ADULT, UNSPECIFIED OBESITY TYPE, UNSPECIFIED WHETHER SERIOUS COMORBIDITY PRESENT: ICD-10-CM

## 2024-05-15 DIAGNOSIS — E11.21 DIABETIC NEPHROPATHY ASSOCIATED WITH TYPE 2 DIABETES MELLITUS (HCC): ICD-10-CM

## 2024-05-15 DIAGNOSIS — E78.5 DYSLIPIDEMIA: ICD-10-CM

## 2024-05-15 DIAGNOSIS — R73.09 HIGH GLUCOSE LEVEL: ICD-10-CM

## 2024-05-15 DIAGNOSIS — I10 ESSENTIAL HYPERTENSION, BENIGN: ICD-10-CM

## 2024-05-15 DIAGNOSIS — E11.9 DIABETES MELLITUS TYPE 2, NONINSULIN DEPENDENT (HCC): ICD-10-CM

## 2024-05-15 DIAGNOSIS — E11.65 UNCONTROLLED TYPE 2 DIABETES MELLITUS WITH HYPERGLYCEMIA (HCC): ICD-10-CM

## 2024-05-16 RX ORDER — BLOOD-GLUCOSE SENSOR
1 EACH MISCELLANEOUS
Qty: 6 EACH | Refills: 1 | Status: SHIPPED | OUTPATIENT
Start: 2024-05-16 | End: 2024-08-14

## 2024-05-16 NOTE — TELEPHONE ENCOUNTER
Endocrine Refill protocol for CGM supplies       Protocol Criteria:  Appointment with Endocrinology completed in the last 12 months or scheduled in the next 6 months     Verify appointment has been completed or scheduled in the appropriate timeline. If so can send a 90 day supply with 1 refill.        Last completed office visit:05/10/24  Next scheduled Follow up: 06/07/24

## 2024-06-07 ENCOUNTER — OFFICE VISIT (OUTPATIENT)
Facility: CLINIC | Age: 59
End: 2024-06-07
Payer: COMMERCIAL

## 2024-06-07 VITALS
HEIGHT: 64 IN | DIASTOLIC BLOOD PRESSURE: 80 MMHG | HEART RATE: 72 BPM | WEIGHT: 224.38 LBS | BODY MASS INDEX: 38.31 KG/M2 | SYSTOLIC BLOOD PRESSURE: 128 MMHG | OXYGEN SATURATION: 98 %

## 2024-06-07 DIAGNOSIS — R73.09 HIGH GLUCOSE LEVEL: ICD-10-CM

## 2024-06-07 DIAGNOSIS — E11.9 DIABETES MELLITUS TYPE 2, NONINSULIN DEPENDENT (HCC): Primary | ICD-10-CM

## 2024-06-07 DIAGNOSIS — E11.65 UNCONTROLLED TYPE 2 DIABETES MELLITUS WITH HYPERGLYCEMIA (HCC): ICD-10-CM

## 2024-06-07 DIAGNOSIS — I10 ESSENTIAL HYPERTENSION, BENIGN: ICD-10-CM

## 2024-06-07 DIAGNOSIS — E11.9 TYPE 2 DIABETES MELLITUS WITHOUT COMPLICATION, WITHOUT LONG-TERM CURRENT USE OF INSULIN (HCC): ICD-10-CM

## 2024-06-07 DIAGNOSIS — E66.9 CLASS 2 OBESITY WITH BODY MASS INDEX (BMI) OF 38.0 TO 38.9 IN ADULT, UNSPECIFIED OBESITY TYPE, UNSPECIFIED WHETHER SERIOUS COMORBIDITY PRESENT: ICD-10-CM

## 2024-06-07 DIAGNOSIS — E78.5 DYSLIPIDEMIA: ICD-10-CM

## 2024-06-07 DIAGNOSIS — E11.21 DIABETIC NEPHROPATHY ASSOCIATED WITH TYPE 2 DIABETES MELLITUS (HCC): ICD-10-CM

## 2024-06-07 PROCEDURE — 3074F SYST BP LT 130 MM HG: CPT | Performed by: INTERNAL MEDICINE

## 2024-06-07 PROCEDURE — 99214 OFFICE O/P EST MOD 30 MIN: CPT | Performed by: INTERNAL MEDICINE

## 2024-06-07 PROCEDURE — 95251 CONT GLUC MNTR ANALYSIS I&R: CPT | Performed by: INTERNAL MEDICINE

## 2024-06-07 PROCEDURE — 3008F BODY MASS INDEX DOCD: CPT | Performed by: INTERNAL MEDICINE

## 2024-06-07 PROCEDURE — 3079F DIAST BP 80-89 MM HG: CPT | Performed by: INTERNAL MEDICINE

## 2024-06-07 RX ORDER — METFORMIN HYDROCHLORIDE 500 MG/1
500 TABLET, EXTENDED RELEASE ORAL
Qty: 180 TABLET | Refills: 1 | Status: SHIPPED | OUTPATIENT
Start: 2024-06-07

## 2024-06-07 RX ORDER — SEMAGLUTIDE 1.34 MG/ML
1 INJECTION, SOLUTION SUBCUTANEOUS WEEKLY
Qty: 9 ML | Refills: 1 | Status: SHIPPED | OUTPATIENT
Start: 2024-06-07

## 2024-06-07 RX ORDER — BLOOD SUGAR DIAGNOSTIC
1 STRIP MISCELLANEOUS 4 TIMES DAILY
Qty: 100 EACH | Refills: 2 | Status: SHIPPED | OUTPATIENT
Start: 2024-06-07 | End: 2024-09-05

## 2024-06-07 RX ORDER — ROSUVASTATIN CALCIUM 20 MG/1
20 TABLET, COATED ORAL NIGHTLY
Qty: 90 TABLET | Refills: 1 | Status: SHIPPED | OUTPATIENT
Start: 2024-06-07

## 2024-06-07 RX ORDER — BLOOD-GLUCOSE SENSOR
1 EACH MISCELLANEOUS
Qty: 6 EACH | Refills: 1 | Status: SHIPPED | OUTPATIENT
Start: 2024-06-07 | End: 2024-09-05

## 2024-06-07 RX ORDER — INSULIN GLARGINE 100 [IU]/ML
25 INJECTION, SOLUTION SUBCUTANEOUS NIGHTLY
Qty: 22.5 ML | Refills: 0 | Status: SHIPPED | OUTPATIENT
Start: 2024-06-07 | End: 2024-09-05

## 2024-06-07 RX ORDER — INSULIN ASPART 100 [IU]/ML
8 INJECTION, SOLUTION INTRAVENOUS; SUBCUTANEOUS
Qty: 21.6 ML | Refills: 0 | Status: SHIPPED | OUTPATIENT
Start: 2024-06-07 | End: 2024-09-05

## 2024-06-07 NOTE — PATIENT INSTRUCTIONS
Will increase Ozempic 0.5 mg to 1 mg/week   Lantus 26 units daily   Novolog with meals 8 units with larger meals. Take 4 with smaller meals.   Will rechallenge low dose metformin 500 mg /day   Rx for valsartan /hydrochlorothiazide and Rosuvastatin     Walk 180 min/week   Limit cabrs  Check blood sugar fasting, before meals and before bedtime.   If you have low blood sugar <70, take 15 grams of carb (8 oz juice or regular soda) and recheck in 15 minutes.    Follow up with podiatry and eye doctor annually.   Patients need to wear covered shoes all the time and check feet daily.   Bring your meter/BG log to the next visit.      Target A1c 6.5%  Target BG   BEFORE MEAL 100-130mg/dl  2hrs AFTER MEAL less than 180mg/dl

## 2024-06-07 NOTE — PROGRESS NOTES
Reason for Visit:  uncontrolled DM with nephropathy, hyperglycemia, HTN, DLP,.     Requesting Physician:   ..Chandrika Cam MD      CHIEF COMPLAINT:    Chief Complaint   Patient presents with    Diabetes     Follow-up and glucose check last A1c was 5/10/24 8.5        HISTORY OF PRESENT ILLNESS:   Irish Wolf is a 58 year old female who presents with  uncontrolled DM with nephropathy, hyperglycemia, HTN, DLP, and obesity.              She has been feeling sick w/ URI for 2 weeks and BG is high  Could not get mounjaro    Stopped Jardiance d/t yeast infection sx   We reviewed BG readings on her CGM   It was better before but can be d/t med changes and infection       t2DM Dx  > 5yrs ago per Pt    She is on   Ozempic 0.5 mg /week   Lantus 25 units daily   Novolog with meals 8 units with larger meals B and L- still does not take it for dinner       Had GI SE from metformin   Had local reaction and SOB from trulicity   Was on glipizide     . 14 day CGM data showed   GMI 7.8 %  TIR 44 %  high 49  %  low %  very high 7 %  Very low   %    Discussed her readings  DLP Crestor 20   HTN Valsartan/hydrochlorothiazide           DM quality measures:  A1C/Blood pressure: as reported above   Nephropathy screenin2023  continue ace /arb rx.   LIPID screenin2023  . on statin rx.   Last dilated eye exam: Last Dilated Eye Exam: 23     Exam shows retinopathy? Eye Exam shows Diabetic Retinopathy?: No    Last diabetic foot exam: No data recorded 2024  Dentist : recommend every 6m  Neuropathy:  no  CAD/ASCVD/PAD/CVA: no   Cholesterol Meds: rosuvastatin Tabs - 20 MG   Cholesterol: 157, done on 2023.  HDL Cholesterol: 55, done on 2023.  LDL Cholesterol: 65, done on 2023.  TriGlycerides 230, done on 2023.    Wt Readings from Last 6 Encounters:   24 224 lb 6.4 oz (101.8 kg)   05/10/24 219 lb (99.3 kg)   24 183 lb (83 kg)   24 220 lb (99.8 kg)   24 210 lb (95.3 kg)   10/18/23 212 lb 6.4 oz  (96.3 kg)            ASSESSMENT AND PLAN:  58 year old  woman  uncontrolled DM with nephropathy, hyperglycemia, HTN, DLP, and obesity.  Had CDE consult   Getting post prandial hyperglycemia still rosalino after dinner but she is missing dinner doses now   Will avoid SGLT2i d/t yeast infection   D/w pt hypo and hyperglycemia   Will titrate up GLP-1     plan  Will increase Ozempic 0.5 mg to 1 mg/week   Lantus 26 units daily   Novolog with meals 8 units with larger meals. Take 4 with smaller meals.   Will rechallenge low dose metformin 500 mg /day   Rx for valsartan /hydrochlorothiazide and Rosuvastatin     Walk 180 min/week   Limit cabrs  Check blood sugar fasting, before meals and before bedtime.   If you have low blood sugar <70, take 15 grams of carb (8 oz juice or regular soda) and recheck in 15 minutes.    Follow up with podiatry and eye doctor annually.   Patients need to wear covered shoes all the time and check feet daily.   Bring your meter/BG log to the next visit.      Target A1c 6.5%  Target BG   BEFORE MEAL 100-130mg/dl  2hrs AFTER MEAL less than 180mg/dl    We discussed these in detail with the patient and negotiated which of numerous possible changes in the in the treatment plan that would be acceptable to them. The patient remains at ongoing is at high risk for complications related to uncontrolled diabetes and treatment.  The patient requires a great deal of self-management and support. We expect the patient's risk to be reduced with the changes to the treatment plan that we recommended today.   We reviewed a very large amount of complicated data including BG target range, basal insulin doses, meal and correction related insulin boluses, time of meal, size of meal, time of BG testing and insulin administration in relations to meals. We also reviewed self-testing BG results if available.         PAST MEDICAL HISTORY:   Past Medical History:    Diverticulitis    Hernia    HTN (hypertension)    Hyperlipidemia        PAST SURGICAL HISTORY:   Past Surgical History:   Procedure Laterality Date    Hernia surgery      Other surgical history      Colectomy with anastomosis       CURRENT MEDICATIONS:     semaglutide (OZEMPIC, 1 MG/DOSE,) 4 MG/3ML Subcutaneous Solution Pen-injector Inject 1 mg into the skin once a week. 9 mL 1    metFORMIN  MG Oral Tablet 24 Hr Take 1 tablet (500 mg total) by mouth daily with breakfast. 180 tablet 1    Continuous Glucose Sensor (FREESTYLE AKANKSHA 3 SENSOR) Does not apply Misc 1 each every 14 (fourteen) days. 6 each 1    insulin aspart (NOVOLOG FLEXPEN) 100 Units/mL Subcutaneous Solution Pen-injector Inject 8 Units into the skin 3 (three) times daily before meals. 21.6 mL 0    insulin glargine (LANTUS SOLOSTAR) 100 UNIT/ML Subcutaneous Solution Pen-injector Inject 25 Units into the skin nightly. 22.5 mL 0    Insulin Pen Needle (PEN NEEDLES) 32G X 6 MM Does not apply Misc 1 each 4 (four) times daily. 100 each 2    Insulin Pen Needle (PEN NEEDLES) 32G X 6 MM Does not apply Misc 1 each 4 (four) times daily. 100 each 2    rosuvastatin 20 MG Oral Tab Take 1 tablet (20 mg total) by mouth nightly. 90 tablet 1    valsartan-hydroCHLOROthiazide 80-12.5 MG Oral Tab Take 1 tablet by mouth daily. 90 tablet 1         ALLERGIES:  No Known Allergies    SOCIAL HISTORY:    Social History     Socioeconomic History    Marital status: Single   Tobacco Use    Smoking status: Former     Current packs/day: 0.00     Average packs/day: 0.5 packs/day for 30.0 years (15.0 ttl pk-yrs)     Types: Cigarettes     Start date: 10/1/1990     Quit date: 10/1/2020     Years since quitting: 3.6    Smokeless tobacco: Never   Vaping Use    Vaping status: Never Used   Substance and Sexual Activity    Alcohol use: Never    Drug use: Never       FAMILY HISTORY:   Family History   Problem Relation Age of Onset    Heart Disorder Father         chf    Psychiatric Mother         parkinsons    Diabetes Mother     Dementia Mother    DM in  the family ++          PHYSICAL EXAM:   Height: 5' 4\" (162.6 cm) (818)  Weight: 224 lb 6.4 oz (101.8 kg) (818)  BSA (Calculated - sq m): 2.05 sq meters (818)  Pulse: 72 (818)  BP: 128/80 (818)  Temp: --  Do Not Use - Resp Rate: --  SpO2: 98 % (818)    Body mass index is 38.52 kg/m².  Obese        CONSTITUTIONAL:  Awake and alert. Age appropriate, good hygiene not in acute distress. Well nourished and well developed. no acute distress   PSYCH:   Orientated to time, place, person & situation, Normal mood and affect, memory intact, normal insight and judgment, cooperative  Neuro: speech is clear. Awake, alert, no aphasia, no facial asymmetry, no nuchal rigidity  EYES:  No proptosis, no ptosis, conjunctiva normal  DATA:     Pertinent data reviewed        CREATININE UR RANDOM mg/dL 24.90   MALB URINE mg/dL 0.79   MALB/CRE CALC <=30.0 ug/mg 31.7 (H)     Lab Results   Component Value Date    A1C 8.5 (A) 05/10/2024    A1C 9.6 (A) 2024    A1C 8.2 (H) 2023          No results for input(s): \"TSH\", \"T4F\", \"T3F\", \"THYP\" in the last 72 hours.  No results found.          No orders of the defined types were placed in this encounter.    Orders Placed This Encounter    semaglutide (OZEMPIC, 1 MG/DOSE,) 4 MG/3ML Subcutaneous Solution Pen-injector     Sig: Inject 1 mg into the skin once a week.     Dispense:  9 mL     Refill:  1    metFORMIN  MG Oral Tablet 24 Hr     Sig: Take 1 tablet (500 mg total) by mouth daily with breakfast.     Dispense:  180 tablet     Refill:  1    Continuous Glucose Sensor (FREESTYLE AKANKSHA 3 SENSOR) Does not apply Misc     Si each every 14 (fourteen) days.     Dispense:  6 each     Refill:  1    insulin aspart (NOVOLOG FLEXPEN) 100 Units/mL Subcutaneous Solution Pen-injector     Sig: Inject 8 Units into the skin 3 (three) times daily before meals.     Dispense:  21.6 mL     Refill:  0     Patient will also need insulin pen needles    insulin  glargine (LANTUS SOLOSTAR) 100 UNIT/ML Subcutaneous Solution Pen-injector     Sig: Inject 25 Units into the skin nightly.     Dispense:  22.5 mL     Refill:  0    Insulin Pen Needle (PEN NEEDLES) 32G X 6 MM Does not apply Misc     Si each 4 (four) times daily.     Dispense:  100 each     Refill:  2    Insulin Pen Needle (PEN NEEDLES) 32G X 6 MM Does not apply Misc     Si each 4 (four) times daily.     Dispense:  100 each     Refill:  2    rosuvastatin 20 MG Oral Tab     Sig: Take 1 tablet (20 mg total) by mouth nightly.     Dispense:  90 tablet     Refill:  1          This is a specialized patient consultation in endocrinology and required comprehensive review of prior records, as well as current evaluation, with time required for consideration of complex endocrine issues and consultation. For this visit, I personally interviewed the patient, and family member if accompanied, performed the pertinent parts of the history and physical examination. ROS included screening for appropriate endocrine conditions.   Today's diagnosis and plan were reviewed in detail with the patient who states understanding and agrees with plan. I discussed with the patient possible diagnosis, differential diagnosis, need for work up , treatment options, alternatives and side effects.     Please see note for details about time spent which includes:   · pre-visit preparation  · reviewing records  · face to face time with the patient   · timely documentation of the encounter  · ordering medications/tests  · communication with care team  · care coordination    I appreciate the opportunity to be part of your patient's medical care and will keep you, as the referring and primary physicians, informed about the care of your patient, including possible future surgery and pathology findings. Please feel free to contact me should you have any questions.      Melissa Rangel MD

## 2024-07-26 ENCOUNTER — OFFICE VISIT (OUTPATIENT)
Facility: CLINIC | Age: 59
End: 2024-07-26

## 2024-07-26 VITALS
WEIGHT: 228 LBS | HEIGHT: 64.02 IN | BODY MASS INDEX: 38.93 KG/M2 | HEART RATE: 84 BPM | SYSTOLIC BLOOD PRESSURE: 125 MMHG | DIASTOLIC BLOOD PRESSURE: 85 MMHG

## 2024-07-26 DIAGNOSIS — E11.21 DIABETIC NEPHROPATHY ASSOCIATED WITH TYPE 2 DIABETES MELLITUS (HCC): Primary | ICD-10-CM

## 2024-07-26 DIAGNOSIS — E11.9 DIABETES MELLITUS TYPE 2, NONINSULIN DEPENDENT (HCC): ICD-10-CM

## 2024-07-26 DIAGNOSIS — E66.9 CLASS 2 OBESITY WITH BODY MASS INDEX (BMI) OF 38.0 TO 38.9 IN ADULT, UNSPECIFIED OBESITY TYPE, UNSPECIFIED WHETHER SERIOUS COMORBIDITY PRESENT: ICD-10-CM

## 2024-07-26 DIAGNOSIS — E11.65 UNCONTROLLED TYPE 2 DIABETES MELLITUS WITH HYPERGLYCEMIA (HCC): ICD-10-CM

## 2024-07-26 DIAGNOSIS — E78.5 DYSLIPIDEMIA: ICD-10-CM

## 2024-07-26 DIAGNOSIS — E11.9 TYPE 2 DIABETES MELLITUS WITHOUT COMPLICATION, WITHOUT LONG-TERM CURRENT USE OF INSULIN (HCC): ICD-10-CM

## 2024-07-26 DIAGNOSIS — I10 ESSENTIAL HYPERTENSION, BENIGN: ICD-10-CM

## 2024-07-26 DIAGNOSIS — R73.09 HIGH GLUCOSE LEVEL: ICD-10-CM

## 2024-07-26 PROCEDURE — 99214 OFFICE O/P EST MOD 30 MIN: CPT | Performed by: INTERNAL MEDICINE

## 2024-07-26 PROCEDURE — 3008F BODY MASS INDEX DOCD: CPT | Performed by: INTERNAL MEDICINE

## 2024-07-26 PROCEDURE — 3079F DIAST BP 80-89 MM HG: CPT | Performed by: INTERNAL MEDICINE

## 2024-07-26 PROCEDURE — 3074F SYST BP LT 130 MM HG: CPT | Performed by: INTERNAL MEDICINE

## 2024-07-26 PROCEDURE — 95251 CONT GLUC MNTR ANALYSIS I&R: CPT | Performed by: INTERNAL MEDICINE

## 2024-07-26 RX ORDER — METFORMIN HYDROCHLORIDE 500 MG/1
500 TABLET, EXTENDED RELEASE ORAL
Qty: 180 TABLET | Refills: 1 | Status: SHIPPED | OUTPATIENT
Start: 2024-07-26

## 2024-07-26 RX ORDER — INSULIN GLARGINE 100 [IU]/ML
26 INJECTION, SOLUTION SUBCUTANEOUS NIGHTLY
Qty: 23.4 ML | Refills: 0 | Status: SHIPPED | OUTPATIENT
Start: 2024-07-26 | End: 2024-10-24

## 2024-07-26 RX ORDER — BLOOD-GLUCOSE SENSOR
1 EACH MISCELLANEOUS
Qty: 6 EACH | Refills: 1 | Status: SHIPPED | OUTPATIENT
Start: 2024-07-26 | End: 2024-10-24

## 2024-07-26 RX ORDER — ROSUVASTATIN CALCIUM 20 MG/1
20 TABLET, COATED ORAL NIGHTLY
Qty: 90 TABLET | Refills: 1 | Status: SHIPPED | OUTPATIENT
Start: 2024-07-26

## 2024-07-26 RX ORDER — SEMAGLUTIDE 2.68 MG/ML
2 INJECTION, SOLUTION SUBCUTANEOUS WEEKLY
Qty: 9 ML | Refills: 0 | Status: SHIPPED | OUTPATIENT
Start: 2024-10-18 | End: 2025-01-16

## 2024-07-26 RX ORDER — INSULIN ASPART 100 [IU]/ML
8 INJECTION, SOLUTION INTRAVENOUS; SUBCUTANEOUS
Qty: 21.6 ML | Refills: 0 | Status: SHIPPED | OUTPATIENT
Start: 2024-07-26 | End: 2024-10-24

## 2024-07-26 RX ORDER — BLOOD SUGAR DIAGNOSTIC
1 STRIP MISCELLANEOUS 4 TIMES DAILY
Qty: 100 EACH | Refills: 2 | Status: SHIPPED | OUTPATIENT
Start: 2024-07-26 | End: 2024-10-24

## 2024-07-26 NOTE — PROGRESS NOTES
Name: Irish Wolf  YOB: 1965  Report Period: 07/13/2024 - 07/26/2024 (14 days)  Generated: 07/26/2024  % Time CGM Active: 96%      Glucose Statistics and Targets  Average Glucose: 159 mg/dL  Glucose Management Indicator (GMI): 7.1%  Glucose Variability (%CV): 23.5%  Target Range: 70 - 180 mg/dL      Time in Ranges  Very High: >250 mg/dL --- 1%  High: 181 - 250 mg/dL --- 26%  Target Range: 70 - 180 mg/dL --- 73%  Low: 54 - 69 mg/dL --- 0%  Very Low: <54 mg/dL --- 0%

## 2024-07-26 NOTE — PROGRESS NOTES
Reason for Visit:  uncontrolled DM with nephropathy, hyperglycemia, HTN, DLP,.     Requesting Physician:   ..Chandrika Cam MD      CHIEF COMPLAINT:    Chief Complaint   Patient presents with    Diabetes     Pt is in for a Diabetes follow up        HISTORY OF PRESENT ILLNESS:   Irish Wolf is a 58 year old female who presents with  uncontrolled DM with nephropathy, hyperglycemia, HTN, DLP, and obesity.       Happy with BG readings   Gained wt   Could not get mounjaro    Stopped Jardiance d/t yeast infection sx     We reviewed BG readings on her CGM    Hyperglycemia post prandial mainly lunch and B  She had low BG when went for a walk after dinner         t2DM Dx  > 5yrs ago per Pt    She is on   Ozempic 1 mg /week   Lantus 26 units daily   Novolog with meals 8 units with larger meals B and L- still does not take it for dinner       Had GI SE from metformin   Had local reaction and SOB from trulicity   Was on glipizide     . 14 day CGM data showed   GMI 7.1 %  TIR 73 %  high 26  %  low %  very high1 %  Very low   %    Discussed her readings  DLP Crestor 20   HTN Valsartan/hydrochlorothiazide        DM quality measures:  A1C/Blood pressure: as reported above   Nephropathy screenin/24  continue ace /arb rx.   LIPID screenin2023  . on statin rx.   Last dilated eye exam: Last Dilated Eye Exam: 23     Exam shows retinopathy? Eye Exam shows Diabetic Retinopathy?: No    Last diabetic foot exam: No data recorded 2024  Dentist : recommend every 6m  Neuropathy:  no  CAD/ASCVD/PAD/CVA: no   Cholesterol Meds: rosuvastatin Tabs - 20 MG   Cholesterol: 157, done on 2023.  HDL Cholesterol: 55, done on 2023.  LDL Cholesterol: 65, done on 2023.  TriGlycerides 230, done on 2023.    Wt Readings from Last 6 Encounters:   24 228 lb (103.4 kg)   24 224 lb 6.4 oz (101.8 kg)   05/10/24 219 lb (99.3 kg)   24 183 lb (83 kg)   24 220 lb (99.8 kg)   24 210 lb (95.3 kg)             ASSESSMENT AND PLAN:  58 year old  woman  uncontrolled DM with nephropathy, hyperglycemia, HTN, DLP, and obesity.  Had CDE consult   Getting post prandial hyperglycemia    Will avoid SGLT2i d/t yeast infection   D/w pt hypo and hyperglycemia . Decrease insulin doses if eating smaller meals or will be more active   Will titrate up GLP-1     Plan  Labs before next visit   Will increase Ozempic 1 mg to 2  mg/week   Lantus 26 units daily   Novolog with meals 8 -> 10 units with larger meals. Take 4 with smaller meals. When you go up on ozempic and start to see improvement in BG, ok to decrease doses back to 8 units. If will walk, decrease novolog by 2 units.       metformin 500 mg /day   Rx  Rosuvastatin    valsartan /hydrochlorothiazide  Walk 180 min/week   Limit cabrs  Check blood sugar fasting, before meals and before bedtime.   If you have low blood sugar <70, take 15 grams of carb (8 oz juice or regular soda) and recheck in 15 minutes.    Follow up with podiatry and eye doctor annually.   Patients need to wear covered shoes all the time and check feet daily.   Bring your meter/BG log to the next visit.      Target A1c 6.5%  Target BG   BEFORE MEAL 100-130mg/dl  2hrs AFTER MEAL less than 180mg/dl    We discussed these in detail with the patient and negotiated which of numerous possible changes in the in the treatment plan that would be acceptable to them. The patient remains at ongoing is at high risk for complications related to uncontrolled diabetes and treatment.  The patient requires a great deal of self-management and support. We expect the patient's risk to be reduced with the changes to the treatment plan that we recommended today.   We reviewed a very large amount of complicated data including BG target range, basal insulin doses, meal   related insulin boluses, time of meal, size of meal, time of BG testing and insulin administration in relations to meals. We also reviewed self-testing BG results if  available.         PAST MEDICAL HISTORY:   Past Medical History:    Diverticulitis    Hernia    HTN (hypertension)    Hyperlipidemia       PAST SURGICAL HISTORY:   Past Surgical History:   Procedure Laterality Date    Hernia surgery      Other surgical history      Colectomy with anastomosis       CURRENT MEDICATIONS:     insulin aspart (NOVOLOG FLEXPEN) 100 Units/mL Subcutaneous Solution Pen-injector Inject 8 Units into the skin 3 (three) times daily before meals. 21.6 mL 0    Continuous Glucose Sensor (FREESTYLE AKANKSHA 3 SENSOR) Does not apply Misc 1 each every 14 (fourteen) days. 6 each 1    Insulin Pen Needle (PEN NEEDLES) 32G X 6 MM Does not apply Misc 1 each 4 (four) times daily. 100 each 2    Insulin Pen Needle (PEN NEEDLES) 32G X 6 MM Does not apply Misc 1 each 4 (four) times daily. 100 each 2    metFORMIN  MG Oral Tablet 24 Hr Take 1 tablet (500 mg total) by mouth daily with breakfast. 180 tablet 1    rosuvastatin 20 MG Oral Tab Take 1 tablet (20 mg total) by mouth nightly. 90 tablet 1    insulin glargine (LANTUS SOLOSTAR) 100 UNIT/ML Subcutaneous Solution Pen-injector Inject 26 Units into the skin nightly. 23.4 mL 0    [START ON 10/18/2024] semaglutide (OZEMPIC, 2 MG/DOSE,) 8 MG/3ML Subcutaneous Solution Pen-injector Inject 2 mg into the skin once a week. 9 mL 0         ALLERGIES:  No Known Allergies    SOCIAL HISTORY:    Social History     Socioeconomic History    Marital status: Single   Tobacco Use    Smoking status: Former     Current packs/day: 0.00     Average packs/day: 0.5 packs/day for 30.0 years (15.0 ttl pk-yrs)     Types: Cigarettes     Start date: 10/1/1990     Quit date: 10/1/2020     Years since quitting: 3.8    Smokeless tobacco: Never   Vaping Use    Vaping status: Never Used   Substance and Sexual Activity    Alcohol use: Never    Drug use: Never       FAMILY HISTORY:   Family History   Problem Relation Age of Onset    Heart Disorder Father         chf    Psychiatric Mother          parkinsons    Diabetes Mother     Dementia Mother    DM in the family ++          PHYSICAL EXAM:   Height: 5' 4.02\" (162.6 cm) (07/26 1046)  Weight: 228 lb (103.4 kg) (07/26 1046)  BSA (Calculated - sq m): 2.07 sq meters (07/26 1046)  Pulse: 84 (07/26 1046)  BP: 125/85 (07/26 1046)  Temp: --  Do Not Use - Resp Rate: --  SpO2: --    Body mass index is 39.12 kg/m².  Obese        CONSTITUTIONAL:  Awake and alert. Age appropriate, good hygiene not in acute distress. Well nourished and well developed. no acute distress   PSYCH:   Orientated to time, place, person & situation, Normal mood and affect, memory intact, normal insight and judgment, cooperative  Neuro: speech is clear. Awake, alert, no aphasia, no facial asymmetry, no nuchal rigidity  EYES:  No proptosis, no ptosis, conjunctiva normal  DATA:     Pertinent data reviewed         Latest Reference Range & Units 08/08/23 07:50 04/05/24 08:12   CREATININE UR RANDOM mg/dL 24.90 20.50   MALB URINE mg/dL 0.79 <0.30   MALB/CRE CALC <=30.0 ug/mg 31.7 (H) See chart for results   (H): Data is abnormally high    Lab Results   Component Value Date    A1C 8.5 (A) 05/10/2024    A1C 9.6 (A) 01/25/2024    A1C 8.2 (H) 08/08/2023          No results for input(s): \"TSH\", \"T4F\", \"T3F\", \"THYP\" in the last 72 hours.  No results found.          Orders Placed This Encounter   Procedures    Lipid Panel [E]    TSH W Reflex To Free T4    Hemoglobin A1C [E]     Orders Placed This Encounter    Lipid Panel [E]     Standing Status:   Future     Standing Expiration Date:   7/26/2025     Order Specific Question:   Release to patient     Answer:   Immediate    TSH W Reflex To Free T4     Standing Status:   Future     Standing Expiration Date:   7/26/2025     Order Specific Question:   Release to patient     Answer:   Immediate    Hemoglobin A1C [E]     Standing Status:   Future     Standing Expiration Date:   7/26/2025     Order Specific Question:   Release to patient     Answer:   Immediate     insulin aspart (NOVOLOG FLEXPEN) 100 Units/mL Subcutaneous Solution Pen-injector     Sig: Inject 8 Units into the skin 3 (three) times daily before meals.     Dispense:  21.6 mL     Refill:  0     Patient will also need insulin pen needles    Continuous Glucose Sensor (FREESTYLE AKANKSHA 3 SENSOR) Does not apply Misc     Si each every 14 (fourteen) days.     Dispense:  6 each     Refill:  1    Insulin Pen Needle (PEN NEEDLES) 32G X 6 MM Does not apply Misc     Si each 4 (four) times daily.     Dispense:  100 each     Refill:  2    Insulin Pen Needle (PEN NEEDLES) 32G X 6 MM Does not apply Misc     Si each 4 (four) times daily.     Dispense:  100 each     Refill:  2    metFORMIN  MG Oral Tablet 24 Hr     Sig: Take 1 tablet (500 mg total) by mouth daily with breakfast.     Dispense:  180 tablet     Refill:  1    rosuvastatin 20 MG Oral Tab     Sig: Take 1 tablet (20 mg total) by mouth nightly.     Dispense:  90 tablet     Refill:  1    insulin glargine (LANTUS SOLOSTAR) 100 UNIT/ML Subcutaneous Solution Pen-injector     Sig: Inject 26 Units into the skin nightly.     Dispense:  23.4 mL     Refill:  0    semaglutide (OZEMPIC, 2 MG/DOSE,) 8 MG/3ML Subcutaneous Solution Pen-injector     Sig: Inject 2 mg into the skin once a week.     Dispense:  9 mL     Refill:  0          This is a specialized patient consultation in endocrinology and required comprehensive review of prior records, as well as current evaluation, with time required for consideration of complex endocrine issues and consultation. For this visit, I personally interviewed the patient, and family member if accompanied, performed the pertinent parts of the history and physical examination. ROS included screening for appropriate endocrine conditions.   Today's diagnosis and plan were reviewed in detail with the patient who states understanding and agrees with plan. I discussed with the patient possible diagnosis, differential diagnosis, need for  work up , treatment options, alternatives and side effects.     Please see note for details about time spent which includes:   · pre-visit preparation  · reviewing records  · face to face time with the patient   · timely documentation of the encounter  · ordering medications/tests  · communication with care team  · care coordination    I appreciate the opportunity to be part of your patient's medical care and will keep you, as the referring and primary physicians, informed about the care of your patient, including possible future surgery and pathology findings. Please feel free to contact me should you have any questions.      Melissa Rangel MD

## 2024-07-26 NOTE — PATIENT INSTRUCTIONS
Labs before next visit   Will increase Ozempic 1 mg to 2  mg/week   Lantus 26 units daily   Novolog with meals 8 -> 10 units with larger meals. Take 4 with smaller meals. When you go up on ozempic and start to see improvement in BG, ok to decrease doses back to 8 units. If will walk, decrease novolog by 2 units.       metformin 500 mg /day   Rx for  Rosuvastatin

## 2024-08-19 NOTE — TELEPHONE ENCOUNTER
Dr. Rangel, Patient is requesting referral to see dietician. Order has been pended. Thank you.   CBC

## 2024-09-20 ENCOUNTER — OFFICE VISIT (OUTPATIENT)
Facility: CLINIC | Age: 59
End: 2024-09-20

## 2024-09-20 VITALS
HEART RATE: 80 BPM | HEIGHT: 64 IN | WEIGHT: 230 LBS | BODY MASS INDEX: 39.27 KG/M2 | DIASTOLIC BLOOD PRESSURE: 70 MMHG | SYSTOLIC BLOOD PRESSURE: 105 MMHG

## 2024-09-20 DIAGNOSIS — E11.9 TYPE 2 DIABETES MELLITUS WITHOUT COMPLICATION, WITHOUT LONG-TERM CURRENT USE OF INSULIN (HCC): ICD-10-CM

## 2024-09-20 DIAGNOSIS — I10 ESSENTIAL HYPERTENSION, BENIGN: ICD-10-CM

## 2024-09-20 DIAGNOSIS — R73.09 HIGH GLUCOSE LEVEL: ICD-10-CM

## 2024-09-20 DIAGNOSIS — E66.9 CLASS 2 OBESITY WITH BODY MASS INDEX (BMI) OF 38.0 TO 38.9 IN ADULT, UNSPECIFIED OBESITY TYPE, UNSPECIFIED WHETHER SERIOUS COMORBIDITY PRESENT: ICD-10-CM

## 2024-09-20 DIAGNOSIS — E11.65 UNCONTROLLED TYPE 2 DIABETES MELLITUS WITH HYPERGLYCEMIA (HCC): Primary | ICD-10-CM

## 2024-09-20 DIAGNOSIS — E11.9 DIABETES MELLITUS TYPE 2, NONINSULIN DEPENDENT (HCC): ICD-10-CM

## 2024-09-20 DIAGNOSIS — E78.5 DYSLIPIDEMIA: ICD-10-CM

## 2024-09-20 DIAGNOSIS — E11.21 DIABETIC NEPHROPATHY ASSOCIATED WITH TYPE 2 DIABETES MELLITUS (HCC): ICD-10-CM

## 2024-09-20 LAB
GLUCOSE BLOOD: 243
HEMOGLOBIN A1C: 7 % (ref 4.3–5.6)
TEST STRIP LOT #: NORMAL NUMERIC

## 2024-09-20 PROCEDURE — 95251 CONT GLUC MNTR ANALYSIS I&R: CPT | Performed by: INTERNAL MEDICINE

## 2024-09-20 PROCEDURE — 3008F BODY MASS INDEX DOCD: CPT | Performed by: INTERNAL MEDICINE

## 2024-09-20 PROCEDURE — 99214 OFFICE O/P EST MOD 30 MIN: CPT | Performed by: INTERNAL MEDICINE

## 2024-09-20 PROCEDURE — 3074F SYST BP LT 130 MM HG: CPT | Performed by: INTERNAL MEDICINE

## 2024-09-20 PROCEDURE — 3078F DIAST BP <80 MM HG: CPT | Performed by: INTERNAL MEDICINE

## 2024-09-20 RX ORDER — BLOOD-GLUCOSE SENSOR
1 EACH MISCELLANEOUS
Qty: 6 EACH | Refills: 1 | Status: SHIPPED | OUTPATIENT
Start: 2024-09-20 | End: 2024-12-19

## 2024-09-20 RX ORDER — INSULIN GLARGINE 100 [IU]/ML
26 INJECTION, SOLUTION SUBCUTANEOUS NIGHTLY
Qty: 23.4 ML | Refills: 0 | Status: SHIPPED | OUTPATIENT
Start: 2024-09-20 | End: 2024-12-19

## 2024-09-20 RX ORDER — SEMAGLUTIDE 2.68 MG/ML
2 INJECTION, SOLUTION SUBCUTANEOUS WEEKLY
Qty: 9 ML | Refills: 0 | Status: SHIPPED | OUTPATIENT
Start: 2024-10-18 | End: 2025-01-16

## 2024-09-20 RX ORDER — METFORMIN HCL 500 MG
500 TABLET, EXTENDED RELEASE 24 HR ORAL
Qty: 180 TABLET | Refills: 1 | Status: SHIPPED | OUTPATIENT
Start: 2024-09-20

## 2024-09-20 RX ORDER — ROSUVASTATIN CALCIUM 20 MG/1
20 TABLET, COATED ORAL NIGHTLY
Qty: 90 TABLET | Refills: 1 | Status: SHIPPED | OUTPATIENT
Start: 2024-09-20

## 2024-09-20 NOTE — PROGRESS NOTES
Name: Irish Wolf  YOB: 1965  Report Period: 08/24/2024 - 09/20/2024 (28 days)  Generated: 09/20/2024  % Time CGM Active: 98%      Glucose Statistics and Targets  Average Glucose: 151 mg/dL  Glucose Management Indicator (GMI): 6.9%  Glucose Variability (%CV): 21.5%  Target Range: 70 - 180 mg/dL      Time in Ranges  Very High: >250 mg/dL --- 0%  High: 181 - 250 mg/dL --- 18%  Target Range: 70 - 180 mg/dL --- 82%  Low: 54 - 69 mg/dL --- 0%  Very Low: <54 mg/dL --- 0%

## 2024-09-20 NOTE — PATIENT INSTRUCTIONS
Labs before next visit in 3 mo   Diabetic eye exam this year     Ozempic 2  mg/week   Lantus 26 units daily   Novolog with meals 10 units with larger meals. Take 7 with smaller meals or will go for a walk after.  Ok to skip this dose if will skip a meal .     metformin 500 mg /day   Rosuvastatin      Valsartan /hydrochlorothiazide  Walk 180 min/week   Limit cabrs

## 2024-10-24 ENCOUNTER — TELEPHONE (OUTPATIENT)
Dept: ENDOCRINOLOGY CLINIC | Facility: CLINIC | Age: 59
End: 2024-10-24

## 2024-10-24 NOTE — TELEPHONE ENCOUNTER
Dr. Rangel,     Pt reporting low BG. Pt states that she has been doing Novolog 10 units TID. Advised pt that per TE 9/20:     Ozempic 2  mg/week   Lantus 26 units daily   Novolog with meals 10 units with larger meals. Take 7 with smaller meals or will go for a walk after.  Ok to skip this dose if will skip a meal .     Advised pt to decrease dose to 7 with smaller meals. Pt agreeable to decrease.     Hypoglycemia    Onset of hypoglycemia: 10/20    BG levels: Laurita report sent via [employer]     Pattern of hypoglycemia: Evening     Symptoms: anxious, shaky    Acute illness: No    Hypoglycemia Mx/Rule of 15: candy. Rule of 15 explained     Change in Diet: 3 meals a day, has been avoiding carbs. Advised pt to     List Medications/Compliance:   Novolg 10/10/10  Lantus 26 units PM   mg, occ, has not taken in the last 2 days  Ozempic 2 mg weekly

## 2024-10-24 NOTE — TELEPHONE ENCOUNTER
Called and provided Dr Rangel's recommendations. Pt verbalized understanding. Advised to call if BG is <70 or persistently >250.

## 2024-10-24 NOTE — TELEPHONE ENCOUNTER
Novolog with meals 8 units with larger meals.   Take 5 with smaller meals or will go for a walk after.   Ok to skip this dose if will skip a meal   thanks

## 2024-10-24 NOTE — TELEPHONE ENCOUNTER
Patient states that she currently has a CGM and for the past week her Blood Sugar levels have been crashing.  She states at times it drops to 60.  Patient states it was at 62 a few minutes ago but ate a piece of candy. Transferred call to RN

## 2024-11-11 DIAGNOSIS — I10 ESSENTIAL HYPERTENSION, BENIGN: ICD-10-CM

## 2024-11-14 RX ORDER — VALSARTAN AND HYDROCHLOROTHIAZIDE 80; 12.5 MG/1; MG/1
1 TABLET, FILM COATED ORAL DAILY
Qty: 90 TABLET | Refills: 3 | Status: SHIPPED | OUTPATIENT
Start: 2024-11-14

## 2024-11-14 NOTE — TELEPHONE ENCOUNTER
Please review. Protocol Failed; No Protocol    Message sent to patient to complete labs     Requested Prescriptions   Pending Prescriptions Disp Refills    VALSARTAN-HYDROCHLOROTHIAZIDE 80-12.5 MG Oral Tab [Pharmacy Med Name: VALSARTAN/HCTZ 80MG/12.5MG TABLETS] 90 tablet 1     Sig: Take 1 tablet by mouth daily.       Hypertension Medications Protocol Failed - 11/14/2024  4:55 PM        Failed - CMP or BMP in past 12 months        Failed - EGFRCR or GFRNAA > 50     GFR Evaluation            Passed - Last BP reading less than 140/90     BP Readings from Last 1 Encounters:   09/20/24 105/70               Passed - In person appointment or virtual visit in the past 12 mos or appointment in next 3 mos     Recent Outpatient Visits              1 month ago Uncontrolled type 2 diabetes mellitus with hyperglycemia ([facility])    [facility], Melissa Dewitt MD    Office Visit    3 months ago Diabetic nephropathy associated with type 2 diabetes mellitus ([facility])    [facility], Melissa Dewitt MD    Office Visit    5 months ago Diabetes mellitus type 2, noninsulin dependent ([facility])    [facility], Melissa Dewitt MD    Office Visit    6 months ago Uncontrolled type 2 diabetes mellitus with hyperglycemia ([facility])    [facility], Melissa Dewitt MD    Office Visit    7 months ago High glucose level    [facility] Melissa Dewitt MD    Office Visit          Future Appointments         Provider Department Appt Notes    In 1 month Melissa Rangel MD [facility] 3 month follow up                           Future Appointments         Provider Department Appt Notes    In 1 month Melissa Rangel MD [facility] 3 month follow up           Recent Outpatient Visits              1 month ago Uncontrolled type 2 diabetes mellitus with hyperglycemia (HCC)    [facility], Melissa Dewitt MD    Office Visit    3 months ago Diabetic nephropathy associated with type 2 diabetes mellitus (HCC)    [facility], Melissa Dewitt MD    Office Visit    5 months ago Diabetes mellitus type 2, noninsulin dependent (HCC)    [facility], Melissa Dewitt MD    Office Visit    6 months ago Uncontrolled type 2 diabetes mellitus with hyperglycemia (HCC)    [facility], Melissa Dewitt MD    Office Visit    7 months ago High glucose level    [facility], Melissa Dewitt MD    Office Visit

## 2024-12-09 ENCOUNTER — MED REC SCAN ONLY (OUTPATIENT)
Dept: INTERNAL MEDICINE CLINIC | Facility: CLINIC | Age: 59
End: 2024-12-09

## 2024-12-09 ENCOUNTER — TELEPHONE (OUTPATIENT)
Dept: INTERNAL MEDICINE CLINIC | Facility: CLINIC | Age: 59
End: 2024-12-09

## 2024-12-09 NOTE — TELEPHONE ENCOUNTER
Report received from [facility]. Negative for Diabetic Retinopathy updated EMR- sent to scan

## 2024-12-26 ENCOUNTER — TELEPHONE (OUTPATIENT)
Dept: ENDOCRINOLOGY CLINIC | Facility: CLINIC | Age: 59
End: 2024-12-26

## 2024-12-26 NOTE — TELEPHONE ENCOUNTER
Patient called would like to know if she needs lab work before tomorrows appointment. Please call back.

## 2024-12-26 NOTE — TELEPHONE ENCOUNTER
Dr. Rangel --     Do you want pt to repeat labs prior to tomorrow's visit?     Labs were ordered form 7/26 visit however wasn't completed and pt was last seen by you on 9/20

## 2024-12-26 NOTE — TELEPHONE ENCOUNTER
Spoke to pt. Notified to complete labs prior to visit with MD tomorrow. RN advised pt to fast and provided lab location and hours. Pt verbalized understanding. Denies further questions or concerns.

## 2024-12-27 ENCOUNTER — LAB ENCOUNTER (OUTPATIENT)
Dept: LAB | Facility: HOSPITAL | Age: 59
End: 2024-12-27
Attending: INTERNAL MEDICINE
Payer: COMMERCIAL

## 2024-12-27 ENCOUNTER — OFFICE VISIT (OUTPATIENT)
Facility: CLINIC | Age: 59
End: 2024-12-27

## 2024-12-27 VITALS
WEIGHT: 228 LBS | HEART RATE: 81 BPM | DIASTOLIC BLOOD PRESSURE: 70 MMHG | HEIGHT: 64 IN | BODY MASS INDEX: 38.93 KG/M2 | SYSTOLIC BLOOD PRESSURE: 125 MMHG

## 2024-12-27 DIAGNOSIS — E11.9 TYPE 2 DIABETES MELLITUS WITHOUT COMPLICATION, WITHOUT LONG-TERM CURRENT USE OF INSULIN (HCC): ICD-10-CM

## 2024-12-27 DIAGNOSIS — E11.9 DIABETES MELLITUS TYPE 2, NONINSULIN DEPENDENT (HCC): ICD-10-CM

## 2024-12-27 DIAGNOSIS — I10 ESSENTIAL HYPERTENSION, BENIGN: ICD-10-CM

## 2024-12-27 DIAGNOSIS — R73.09 HIGH GLUCOSE LEVEL: ICD-10-CM

## 2024-12-27 DIAGNOSIS — E11.65 UNCONTROLLED TYPE 2 DIABETES MELLITUS WITH HYPERGLYCEMIA (HCC): ICD-10-CM

## 2024-12-27 DIAGNOSIS — E66.812 CLASS 2 OBESITY WITH BODY MASS INDEX (BMI) OF 38.0 TO 38.9 IN ADULT, UNSPECIFIED OBESITY TYPE, UNSPECIFIED WHETHER SERIOUS COMORBIDITY PRESENT: ICD-10-CM

## 2024-12-27 DIAGNOSIS — E11.21 DIABETIC NEPHROPATHY ASSOCIATED WITH TYPE 2 DIABETES MELLITUS (HCC): ICD-10-CM

## 2024-12-27 DIAGNOSIS — E78.5 DYSLIPIDEMIA: ICD-10-CM

## 2024-12-27 DIAGNOSIS — E11.65 UNCONTROLLED TYPE 2 DIABETES MELLITUS WITH HYPERGLYCEMIA (HCC): Primary | ICD-10-CM

## 2024-12-27 DIAGNOSIS — E66.812 CLASS 2 OBESITY WITH BODY MASS INDEX (BMI) OF 39.0 TO 39.9 IN ADULT, UNSPECIFIED OBESITY TYPE, UNSPECIFIED WHETHER SERIOUS COMORBIDITY PRESENT: ICD-10-CM

## 2024-12-27 LAB
CHOLEST SERPL-MCNC: 145 MG/DL (ref ?–200)
EST. AVERAGE GLUCOSE BLD GHB EST-MCNC: 174 MG/DL (ref 68–126)
FASTING PATIENT LIPID ANSWER: YES
GLUCOSE BLOOD: 183
HBA1C MFR BLD: 7.7 % (ref ?–5.7)
HDLC SERPL-MCNC: 59 MG/DL (ref 40–59)
HEMOGLOBIN A1C: 7.5 % (ref 4.3–5.6)
LDLC SERPL CALC-MCNC: 62 MG/DL (ref ?–100)
NONHDLC SERPL-MCNC: 86 MG/DL (ref ?–130)
TEST STRIP LOT #: NORMAL NUMERIC
TRIGL SERPL-MCNC: 140 MG/DL (ref 30–149)
TSI SER-ACNC: 1.7 UIU/ML (ref 0.55–4.78)
VLDLC SERPL CALC-MCNC: 21 MG/DL (ref 0–30)

## 2024-12-27 PROCEDURE — 80061 LIPID PANEL: CPT

## 2024-12-27 PROCEDURE — 99214 OFFICE O/P EST MOD 30 MIN: CPT | Performed by: INTERNAL MEDICINE

## 2024-12-27 PROCEDURE — 84443 ASSAY THYROID STIM HORMONE: CPT

## 2024-12-27 PROCEDURE — 3051F HG A1C>EQUAL 7.0%<8.0%: CPT | Performed by: INTERNAL MEDICINE

## 2024-12-27 PROCEDURE — 3078F DIAST BP <80 MM HG: CPT | Performed by: INTERNAL MEDICINE

## 2024-12-27 PROCEDURE — 3008F BODY MASS INDEX DOCD: CPT | Performed by: INTERNAL MEDICINE

## 2024-12-27 PROCEDURE — 36415 COLL VENOUS BLD VENIPUNCTURE: CPT

## 2024-12-27 PROCEDURE — 83036 HEMOGLOBIN GLYCOSYLATED A1C: CPT | Performed by: INTERNAL MEDICINE

## 2024-12-27 PROCEDURE — 83036 HEMOGLOBIN GLYCOSYLATED A1C: CPT

## 2024-12-27 PROCEDURE — 3074F SYST BP LT 130 MM HG: CPT | Performed by: INTERNAL MEDICINE

## 2024-12-27 PROCEDURE — 82947 ASSAY GLUCOSE BLOOD QUANT: CPT | Performed by: INTERNAL MEDICINE

## 2024-12-27 RX ORDER — VALSARTAN AND HYDROCHLOROTHIAZIDE 80; 12.5 MG/1; MG/1
1 TABLET, FILM COATED ORAL DAILY
Qty: 90 TABLET | Refills: 3 | Status: SHIPPED | OUTPATIENT
Start: 2024-12-27

## 2024-12-27 RX ORDER — SEMAGLUTIDE 2.68 MG/ML
2 INJECTION, SOLUTION SUBCUTANEOUS WEEKLY
Qty: 9 ML | Refills: 0 | Status: SHIPPED | OUTPATIENT
Start: 2024-12-27 | End: 2025-03-27

## 2024-12-27 RX ORDER — ROSUVASTATIN CALCIUM 20 MG/1
20 TABLET, COATED ORAL NIGHTLY
Qty: 90 TABLET | Refills: 1 | Status: SHIPPED | OUTPATIENT
Start: 2024-12-27

## 2024-12-27 RX ORDER — INSULIN GLARGINE 100 [IU]/ML
30 INJECTION, SOLUTION SUBCUTANEOUS NIGHTLY
Qty: 30 ML | Refills: 1 | Status: SHIPPED | OUTPATIENT
Start: 2024-12-27 | End: 2025-06-25

## 2024-12-27 RX ORDER — ACYCLOVIR 800 MG/1
1 TABLET ORAL
Qty: 6 EACH | Refills: 2 | Status: SHIPPED | OUTPATIENT
Start: 2024-12-27 | End: 2025-03-27

## 2024-12-27 NOTE — PATIENT INSTRUCTIONS
Ozempic 2  mg/week   Lantus 2 8 units daily   Novolog with meals 12 units with larger meals brkfst and dinner   10 with lunch   . Take 7 with smaller meals or will go for a walk after.  Ok to skip this dose if will skip a meal .          Rosuvastatin      Valsartan /hydrochlorothiazide  Walk 180 min/week   Limit cabrs  Check blood sugar fasting, before meals and before bedtime.   If you have low blood sugar <70, take 15 grams of carb (8 oz juice or regular soda) and recheck in 15 minutes.    Follow up with podiatry and eye doctor annually.   Patients need to wear covered shoes all the time and check feet daily.   Bring your meter/BG log to the next visit.      Target A1c 6.5%  Target BG   BEFORE MEAL 100-130mg/dl  2hrs AFTER MEAL less than 180mg/dl

## 2024-12-27 NOTE — PROGRESS NOTES
Reason for Visit:  uncontrolled DM with nephropathy, hyperglycemia, HTN, DLP,.     Requesting Physician:   .Jeronimo Cam MD      CHIEF COMPLAINT:    Chief Complaint   Patient presents with    Diabetes     F/u        HISTORY OF PRESENT ILLNESS:   Irish Wolf is a 59 year old female who presents with  uncontrolled DM with nephropathy, hyperglycemia, HTN, DLP, and obesity.        Lunch is small or skips sometimes  Not active as before   Gained wt   Stopped MTF   We reviewed CGM data and insulin doses.     2024 A1c 7.5 - higher than before         Could not get mounjaro    Stopped Jardiance d/t yeast infection sx     We reviewed BG readings on her CGM          t2DM Dx  > 5yrs ago per Pt    She is on Ozempic 2  mg/week   Lantus 26 units daily   Novolog with meals 10 units with larger meals and 5 w/ small meals     Stopped metformin     Had GI SE from metformin   Had local reaction and SOB from trulicity   Was on glipizide     . 14 day CGM data showed   GMI 7.5%  TIR 56  %  high 40 %  low %  very high 4  %  Very low   %    Discussed her readings  DLP Crestor 20   HTN Valsartan/hydrochlorothiazide        DM quality measures:  A1C/Blood pressure: as reported above   Nephropathy screenin/24  continue ace /arb rx.   LIPID screenin2023  . on statin rx.   Last dilated eye exam: Last Dilated Eye Exam: 24     Exam shows retinopathy? Eye Exam shows Diabetic Retinopathy?: No    Last diabetic foot exam: No data recorded 2024  Dentist : recommend every 6m  Neuropathy:  no  CAD/ASCVD/PAD/CVA: no   Cholesterol Meds: rosuvastatin Tabs - 20 MG   Cholesterol: 145, done on 2024.  HDL Cholesterol: 59, done on 2024.  LDL Cholesterol: 62, done on 2024.  TriGlycerides 140, done on 2024.    Wt Readings from Last 6 Encounters:   24 228 lb (103.4 kg)   24 230 lb (104.3 kg)   24 228 lb (103.4 kg)   24 224 lb 6.4 oz (101.8 kg)   05/10/24 219 lb (99.3 kg)   24 183 lb  (83 kg)        Micro Albumen/Creatinine:    Lab Results   Component Value Date    MICROALBCREA  2024      Comment:      Unable to calculate due to Urine Microalbumin <0.3 mg/dL        MICROALBCREA 31.7 (H) 2023         ASSESSMENT AND PLAN:  59 year old  woman  uncontrolled DM with nephropathy, hyperglycemia, HTN, DLP, and obesity.  Had CDE consult   Getting post prandial hyperglycemia  after Bbrkfst and dinner     Will avoid SGLT2i d/t yeast infection   D/w pt hypo and hyperglycemia     2024  b a1c: 7.5    Plan  Labs before next visit in 3 mo   Diabetic eye exam this year     Ozempic 2  mg/week   Lantus 28 units daily   Novolog with meals 12 units with larger meals brkfst and dinner   10 with lunch   Take 7 with smaller meals or will go for a walk after.  Ok to skip this dose if will skip a meal .        Rosuvastatin      Valsartan /hydrochlorothiazide  Walk 180 min/week   Limit cabrs  Check blood sugar fasting, before meals and before bedtime.   If you have low blood sugar <70, take 15 grams of carb (8 oz juice or regular soda) and recheck in 15 minutes.    Follow up with podiatry and eye doctor annually.   Patients need to wear covered shoes all the time and check feet daily.   Bring your meter/BG log to the next visit.      Target A1c 6.5%  Target BG   BEFORE MEAL 100-130mg/dl  2hrs AFTER MEAL less than 180mg/dl    We discussed these in detail with the patient and negotiated which of numerous possible changes in the in the treatment plan that would be acceptable to them. The patient remains at ongoing is at high risk for complications related to uncontrolled diabetes and treatment.  The patient requires a great deal of self-management and support. We expect the patient's risk to be reduced with the changes to the treatment plan that we recommended today.   We reviewed a very large amount of complicated data including BG target range, basal insulin doses, meal   related insulin boluses,  time of meal, size of meal, time of BG testing and insulin administration in relations to meals. We also reviewed self-testing BG results if available.         PAST MEDICAL HISTORY:   Past Medical History:    Diabetes (HCC)    Diverticulitis    Hernia    HTN (hypertension)    Hyperlipidemia       PAST SURGICAL HISTORY:   Past Surgical History:   Procedure Laterality Date    Hernia surgery      Other surgical history      Colectomy with anastomosis       CURRENT MEDICATIONS:     semaglutide (OZEMPIC, 2 MG/DOSE,) 8 MG/3ML Subcutaneous Solution Pen-injector Inject 2 mg into the skin once a week. 9 mL 0    rosuvastatin 20 MG Oral Tab Take 1 tablet (20 mg total) by mouth nightly. 90 tablet 1    Continuous Glucose Sensor (FREESTYLE AKANKSHA 3 SENSOR) Does not apply Misc 1 each every 14 (fourteen) days. 6 each 2    insulin glargine (LANTUS SOLOSTAR) 100 UNIT/ML Subcutaneous Solution Pen-injector Inject 30 Units into the skin nightly. 30 mL 1    insulin aspart 100 Units/mL Subcutaneous Solution Pen-injector Inject 14 Units into the skin 3 (three) times daily before meals. 45 mL 1    valsartan-hydroCHLOROthiazide 80-12.5 MG Oral Tab Take 1 tablet by mouth daily. 90 tablet 3         ALLERGIES:  No Known Allergies    SOCIAL HISTORY:    Social History     Socioeconomic History    Marital status: Single   Tobacco Use    Smoking status: Former     Current packs/day: 0.00     Average packs/day: 0.5 packs/day for 30.0 years (15.0 ttl pk-yrs)     Types: Cigarettes     Start date: 10/1/1990     Quit date: 10/1/2020     Years since quittin.2    Smokeless tobacco: Never   Vaping Use    Vaping status: Never Used   Substance and Sexual Activity    Alcohol use: Never    Drug use: Never       FAMILY HISTORY:   Family History   Problem Relation Age of Onset    Heart Disorder Father         chf    Psychiatric Mother         parkinsons    Diabetes Mother     Dementia Mother    DM in the family ++          PHYSICAL EXAM:   Height: 5' 4\"  (162.6 cm) (12/27 0734)  Weight: 228 lb (103.4 kg) (12/27 0734)  BSA (Calculated - sq m): 2.07 sq meters (12/27 0734)  Pulse: 81 (12/27 0734)  BP: 125/70 (12/27 0734)  Temp: --  Do Not Use - Resp Rate: --  SpO2: --    Body mass index is 39.14 kg/m².  Obese        CONSTITUTIONAL:  Awake and alert. Age appropriate, good hygiene not in acute distress. Well nourished and well developed. no acute distress   PSYCH:   Orientated to time, place, person & situation, Normal mood and affect, memory intact, normal insight and judgment, cooperative      DATA:     Pertinent data reviewed      Latest Reference Range & Units 12/27/24 07:14   Cholesterol, Total <200 mg/dL 145   Triglycerides 30 - 149 mg/dL 140   HDL Cholesterol 40 - 59 mg/dL 59   VLDL 0 - 30 mg/dL 21   NON-HDL CHOLESTEROL <130 mg/dL 86   LDL Cholesterol Calc <100 mg/dL 62   Patient Fasting for Lipid?  Yes   TSH 0.550 - 4.780 uIU/mL 1.704         Latest Reference Range & Units 08/08/23 07:50 04/05/24 08:12   CREATININE UR RANDOM mg/dL 24.90 20.50   MALB URINE mg/dL 0.79 <0.30   MALB/CRE CALC <=30.0 ug/mg 31.7 (H) See chart for results   (H): Data is abnormally high    Lab Results   Component Value Date    A1C 7.5 (A) 12/27/2024    A1C 7.7 (H) 12/27/2024    A1C 7.0 (A) 09/20/2024    A1C 8.5 (A) 05/10/2024    A1C 9.6 (A) 01/25/2024          Recent Labs     12/27/24  0714   TSH 1.704     No results found.    Lipid Panel [E]        Component Value Flag Ref Range Units Status    Cholesterol, Total 145      <200 mg/dL Final    Comment:    Desirable  <200 mg/dL  Borderline  200-239 mg/dL  High      >=240 mg/dL        HDL Cholesterol 59      40 - 59 mg/dL Final    Comment:    Interpretive Information:   An HDL cholesterol <40 mg/dL is low and constitutes a coronary heart disease risk factor. An HDL cholesterol >60 mg/dL is a negative risk factor for coronary heart disease.        Triglycerides 140      30 - 149 mg/dL Final    Comment:    Reference interval for fasting  triglycerides  Desirable: <150 mg/dL  Borderline: 150-199 mg/dL  High: 200-499 mg/dL  Very High: >=500 mg/dL          LDL Cholesterol 62      <100 mg/dL Final    Comment:    Optimal            <100 mg/dL   Near/Above OptimaL 100-129 mg/dL   Borderline High    130-159 mg/dL   High               160-189 mg/dL    Very High          >190 mg/dL         VLDL 21      0 - 30 mg/dL Final    Non HDL Chol 86      <130 mg/dL Final    Comment:    Desirable  <130 mg/dL   Borderline  130-159 mg/dL   High        160-189 mg/dL       Very high >=190 mg/dL        Patient Fasting for Lipid? Yes        Final                  TSH W Reflex To Free T4        Component Value Flag Ref Range Units Status    TSH 1.704      0.550 - 4.780 uIU/mL Final                  Hemoglobin A1C [E]        Component Value Flag Ref Range Units Status    HgbA1C 7.7      <5.7 % Final    Comment:     Normal HbA1C:     <5.7%      Pre-Diabetic:     5.7 - 6.4%      Diabetic:         >6.4%      Diabetic Control: <7.0%        Estimated Average Glucose 174      68 - 126 mg/dL Final    Comment:    eAG is the estimated average glucose calculated from Hgb A1c according to the formula recommended by the American Diabetes Association. eAG levels reflect the long term average glucose and may not correlate with random or fasting glucose levels since these represent specific points in time.                         POC HemoCue Glucose 201 (Finger stick glucose)        Component Value Flag Ref Range Units Status    GLUCOSE BLOOD 183        Final    Test Strip Lot # [id number]       Numeric Final    Test Strip Expiration Date 5/30/25       Date Final                  POC Glycohemoglobin [14149]        Component Value Flag Ref Range Units Status    HEMOGLOBIN A1C 7.5      4.3 - 5.6 % Final    Cartridge Lot# [id number]       Numeric Final    Cartridge Expiration Date 8/8/26       Date Final                      Orders Placed This Encounter   Procedures    POC HemoCue Glucose 201  (Finger stick glucose)    POC Glycohemoglobin [35908]    Comp Metabolic Panel (14)    Hemoglobin A1C    Lipid Panel    Microalb/Creat Ratio, Random Urine    TSH W Reflex To Free T4     Orders Placed This Encounter    POC HemoCue Glucose 201 (Finger stick glucose)     Order Specific Question:   Release to patient     Answer:   Immediate    POC Glycohemoglobin [02646]     Order Specific Question:   Release to patient     Answer:   Immediate    Comp Metabolic Panel (14)     Standing Status:   Future     Standing Expiration Date:   2025     Order Specific Question:   Release to patient     Answer:   Immediate    Hemoglobin A1C     Standing Status:   Future     Standing Expiration Date:   2025     Order Specific Question:   Release to patient     Answer:   Immediate    Lipid Panel     Standing Status:   Future     Standing Expiration Date:   2025     Order Specific Question:   Release to patient     Answer:   Immediate    Microalb/Creat Ratio, Random Urine     Standing Status:   Future     Standing Expiration Date:   2025     Order Specific Question:   Release to patient     Answer:   Immediate    TSH W Reflex To Free T4     Standing Status:   Future     Standing Expiration Date:   2025     Order Specific Question:   Release to patient     Answer:   Immediate    semaglutide (OZEMPIC, 2 MG/DOSE,) 8 MG/3ML Subcutaneous Solution Pen-injector     Sig: Inject 2 mg into the skin once a week.     Dispense:  9 mL     Refill:  0    rosuvastatin 20 MG Oral Tab     Sig: Take 1 tablet (20 mg total) by mouth nightly.     Dispense:  90 tablet     Refill:  1    Continuous Glucose Sensor (FREESTYLE AKANKSHA 3 SENSOR) Does not apply Misc     Si each every 14 (fourteen) days.     Dispense:  6 each     Refill:  2    insulin glargine (LANTUS SOLOSTAR) 100 UNIT/ML Subcutaneous Solution Pen-injector     Sig: Inject 30 Units into the skin nightly.     Dispense:  30 mL     Refill:  1    insulin aspart 100 Units/mL  Subcutaneous Solution Pen-injector     Sig: Inject 14 Units into the skin 3 (three) times daily before meals.     Dispense:  45 mL     Refill:  1     Patient will also need insulin pen needles    valsartan-hydroCHLOROthiazide 80-12.5 MG Oral Tab     Sig: Take 1 tablet by mouth daily.     Dispense:  90 tablet     Refill:  3          This is a specialized patient consultation in endocrinology and required comprehensive review of prior records, as well as current evaluation, with time required for consideration of complex endocrine issues and consultation. For this visit, I personally interviewed the patient, and family member if accompanied, performed the pertinent parts of the history and physical examination. ROS included screening for appropriate endocrine conditions.   Today's diagnosis and plan were reviewed in detail with the patient who states understanding and agrees with plan. I discussed with the patient possible diagnosis, differential diagnosis, need for work up , treatment options, alternatives and side effects.     Please see note for details about time spent which includes:   · pre-visit preparation  · reviewing records  · face to face time with the patient   · timely documentation of the encounter  · ordering medications/tests  · communication with care team  · care coordination    I appreciate the opportunity to be part of your patient's medical care and will keep you, as the referring and primary physicians, informed about the care of your patient, including possible future surgery and pathology findings. Please feel free to contact me should you have any questions.      Melissa Rangel MD

## 2024-12-27 NOTE — PROGRESS NOTES
Patient was having technical issues logging into her malou 3 account and is unable to connect to clinic.     Total 20 minutes spent creating a new malou account with her personal email, resetting password, applying new malou 3 plus sensor, and connecting data to "[employer]".

## 2025-01-31 ENCOUNTER — OFFICE VISIT (OUTPATIENT)
Facility: CLINIC | Age: 60
End: 2025-01-31

## 2025-01-31 VITALS
WEIGHT: 232 LBS | BODY MASS INDEX: 39.61 KG/M2 | SYSTOLIC BLOOD PRESSURE: 118 MMHG | HEIGHT: 64 IN | HEART RATE: 88 BPM | DIASTOLIC BLOOD PRESSURE: 74 MMHG

## 2025-01-31 DIAGNOSIS — E78.5 DYSLIPIDEMIA: ICD-10-CM

## 2025-01-31 DIAGNOSIS — K21.9 GASTROESOPHAGEAL REFLUX DISEASE, UNSPECIFIED WHETHER ESOPHAGITIS PRESENT: ICD-10-CM

## 2025-01-31 DIAGNOSIS — I10 ESSENTIAL HYPERTENSION, BENIGN: ICD-10-CM

## 2025-01-31 DIAGNOSIS — E11.65 UNCONTROLLED TYPE 2 DIABETES MELLITUS WITH HYPERGLYCEMIA (HCC): Primary | ICD-10-CM

## 2025-01-31 DIAGNOSIS — E11.21 DIABETIC NEPHROPATHY ASSOCIATED WITH TYPE 2 DIABETES MELLITUS (HCC): ICD-10-CM

## 2025-01-31 DIAGNOSIS — R05.9 COUGH, UNSPECIFIED TYPE: ICD-10-CM

## 2025-01-31 DIAGNOSIS — R09.82 POSTNASAL DRIP: ICD-10-CM

## 2025-01-31 DIAGNOSIS — E66.812 CLASS 2 OBESITY WITH BODY MASS INDEX (BMI) OF 39.0 TO 39.9 IN ADULT, UNSPECIFIED OBESITY TYPE, UNSPECIFIED WHETHER SERIOUS COMORBIDITY PRESENT: ICD-10-CM

## 2025-01-31 PROCEDURE — 3078F DIAST BP <80 MM HG: CPT | Performed by: INTERNAL MEDICINE

## 2025-01-31 PROCEDURE — 3074F SYST BP LT 130 MM HG: CPT | Performed by: INTERNAL MEDICINE

## 2025-01-31 PROCEDURE — 3008F BODY MASS INDEX DOCD: CPT | Performed by: INTERNAL MEDICINE

## 2025-01-31 PROCEDURE — 99214 OFFICE O/P EST MOD 30 MIN: CPT | Performed by: INTERNAL MEDICINE

## 2025-01-31 RX ORDER — PANTOPRAZOLE SODIUM 20 MG/1
20 TABLET, DELAYED RELEASE ORAL
Qty: 60 TABLET | Refills: 0 | Status: SHIPPED | OUTPATIENT
Start: 2025-01-31 | End: 2025-01-31

## 2025-01-31 RX ORDER — PANTOPRAZOLE SODIUM 20 MG/1
20 TABLET, DELAYED RELEASE ORAL
Qty: 180 TABLET | Refills: 0 | Status: SHIPPED | OUTPATIENT
Start: 2025-01-31

## 2025-01-31 RX ORDER — AZELASTINE HYDROCHLORIDE, FLUTICASONE PROPIONATE 137; 50 UG/1; UG/1
1 SPRAY, METERED NASAL 2 TIMES DAILY
Qty: 1 EACH | Refills: 0 | Status: SHIPPED | OUTPATIENT
Start: 2025-01-31

## 2025-01-31 NOTE — PROGRESS NOTES
Reason for Visit:  uncontrolled DM with nephropathy, hyperglycemia, HTN, DLP    Requesting Physician:   .Jeronimo Cam MD      CHIEF COMPLAINT:    No chief complaint on file.       HISTORY OF PRESENT ILLNESS:   Irish Wolf is a 59 year old female who presents with  uncontrolled DM with nephropathy, hyperglycemia, HTN, DLP, and obesity.     Schedule this appt to discuss SE from Meds.   She has cough and hoarseness in the middle of the conversation          mg every day   We reviewed CGM data and insulin doses.     2024 A1c 7.5      Could not get mounjaro    Stopped Jardiance d/t yeast infection sx     We reviewed BG readings on her CGM          t2DM Dx  > 5yrs ago per Pt    She is on   Ozempic 2  mg/week    mg every day   Lantus 286 units daily   Novolog with meals 12/10 /12units with larger meals and 5 w/ small meals              Had local reaction and SOB from trulicity   Was on glipizide     . 14 day CGM data showed   GMI 7.1%  TIR 74  %  high 230 %  low %  very high 3  %  Very low   %    Discussed her readings  DLP Crestor 20   HTN Valsartan/hydrochlorothiazide        DM quality measures:  A1C/Blood pressure: as reported above   Nephropathy screenin/24  continue ace /arb rx.   LIPID screenin2023  . on statin rx.   Last dilated eye exam: Last Dilated Eye Exam: 24     Exam shows retinopathy? Eye Exam shows Diabetic Retinopathy?: No    Last diabetic foot exam: No data recorded 2024  Dentist : recommend every 6m  Neuropathy:  no  CAD/ASCVD/PAD/CVA: no   Cholesterol Meds: rosuvastatin Tabs - 20 MG   Cholesterol: 145, done on 2024.  HDL Cholesterol: 59, done on 2024.  LDL Cholesterol: 62, done on 2024.  TriGlycerides 140, done on 2024.    Wt Readings from Last 6 Encounters:   25 232 lb (105.2 kg)   24 228 lb (103.4 kg)   24 230 lb (104.3 kg)   24 228 lb (103.4 kg)   24 224 lb 6.4 oz (101.8 kg)   05/10/24 219 lb (99.3 kg)         Micro Albumen/Creatinine:    Lab Results   Component Value Date    MICROALBCREA  2024      Comment:      Unable to calculate due to Urine Microalbumin <0.3 mg/dL        MICROALBCREA 31.7 (H) 2023         ASSESSMENT AND PLAN:  59 year old  woman  uncontrolled DM with nephropathy, hyperglycemia, HTN, DLP, and obesity.  Had CDE consult   Getting post prandial hyperglycemia  after Bbrkfst and dinner     Will avoid SGLT2i d/t yeast infection   D/w pt hypo and hyperglycemia     2024  b a1c: 7.5    Plan  Labs before next visit in 3 mo   Diabetic eye exam     Lantus 28 units daily   Novolog with meals 12 units with larger meals brkfst and dinner   10 with lunch   Take 7 with smaller meals or will go for a walk after.  Ok to skip this dose if will skip a meal .     Stop metformin   Will start pantoprazole twice a day   Will pay attention to symptoms - post nasal  drip    Rosuvastatin      Valsartan /hydrochlorothiazide  Walk 180 min/week   Limit cabrs  Check blood sugar fasting, before meals and before bedtime.   If you have low blood sugar <70, take 15 grams of carb (8 oz juice or regular soda) and recheck in 15 minutes.    Follow up with podiatry and eye doctor annually.   Patients need to wear covered shoes all the time and check feet daily.   Bring your meter/BG log to the next visit.      Target A1c 6.5%  Target BG   BEFORE MEAL 100-130mg/dl  2hrs AFTER MEAL less than 180mg/dl    We discussed these in detail with the patient and negotiated which of numerous possible changes in the in the treatment plan that would be acceptable to them. The patient remains at ongoing is at high risk for complications related to uncontrolled diabetes and treatment.  The patient requires a great deal of self-management and support. We expect the patient's risk to be reduced with the changes to the treatment plan that we recommended today.   We reviewed a very large amount of complicated data including BG  target range, basal insulin doses, meal   related insulin boluses, time of meal, size of meal, time of BG testing and insulin administration in relations to meals. We also reviewed self-testing BG results if available.         PAST MEDICAL HISTORY:   Past Medical History:    Diabetes (HCC)    Diverticulitis    Hernia    HTN (hypertension)    Hyperlipidemia       PAST SURGICAL HISTORY:   Past Surgical History:   Procedure Laterality Date    Hernia surgery      Other surgical history      Colectomy with anastomosis       CURRENT MEDICATIONS:     pantoprazole 20 MG Oral Tab EC Take 1 tablet (20 mg total) by mouth 2 (two) times daily before meals. 60 tablet 0    Azelastine-Fluticasone 137-50 MCG/ACT Nasal Suspension 1 spray by Nasal route in the morning and 1 spray before bedtime. 1 each 0         ALLERGIES:  No Known Allergies    SOCIAL HISTORY:    Social History     Socioeconomic History    Marital status: Single   Tobacco Use    Smoking status: Former     Current packs/day: 0.00     Average packs/day: 0.5 packs/day for 30.0 years (15.0 ttl pk-yrs)     Types: Cigarettes     Start date: 10/1/1990     Quit date: 10/1/2020     Years since quittin.3    Smokeless tobacco: Never   Vaping Use    Vaping status: Never Used   Substance and Sexual Activity    Alcohol use: Never    Drug use: Never       FAMILY HISTORY:   Family History   Problem Relation Age of Onset    Heart Disorder Father         chf    Psychiatric Mother         parkinsons    Diabetes Mother     Dementia Mother    DM in the family ++          PHYSICAL EXAM:   Height: 5' 4\" (162.6 cm) (1035)  Weight: 232 lb (105.2 kg) (1035)  BSA (Calculated - sq m): 2.08 sq meters (1035)  Pulse: 88 (1035)  BP: 118/74 (1035)  Temp: --  Do Not Use - Resp Rate: --  SpO2: --    Body mass index is 39.82 kg/m².  Obese        CONSTITUTIONAL:  Awake and alert. Age appropriate, good hygiene not in acute distress. Well nourished and well developed. no  acute distress   PSYCH:   Orientated to time, place, person & situation, Normal mood and affect, memory intact, normal insight and judgment, cooperative      DATA:     Pertinent data reviewed      Latest Reference Range & Units 24 07:14   Cholesterol, Total <200 mg/dL 145   Triglycerides 30 - 149 mg/dL 140   HDL Cholesterol 40 - 59 mg/dL 59   VLDL 0 - 30 mg/dL 21   NON-HDL CHOLESTEROL <130 mg/dL 86   LDL Cholesterol Calc <100 mg/dL 62   Patient Fasting for Lipid?  Yes   TSH 0.550 - 4.780 uIU/mL 1.704         Latest Reference Range & Units 23 07:50 24 08:12   CREATININE UR RANDOM mg/dL 24.90 20.50   MALB URINE mg/dL 0.79 <0.30   MALB/CRE CALC <=30.0 ug/mg 31.7 (H) See chart for results   (H): Data is abnormally high    Lab Results   Component Value Date    A1C 7.5 (A) 2024    A1C 7.7 (H) 2024    A1C 7.0 (A) 2024    A1C 8.5 (A) 05/10/2024    A1C 9.6 (A) 2024          No results for input(s): \"TSH\", \"T4F\", \"T3F\", \"THYP\" in the last 72 hours.    No results found.            No orders of the defined types were placed in this encounter.    Orders Placed This Encounter    pantoprazole 20 MG Oral Tab EC     Sig: Take 1 tablet (20 mg total) by mouth 2 (two) times daily before meals.     Dispense:  60 tablet     Refill:  0    Azelastine-Fluticasone 137-50 MCG/ACT Nasal Suspension     Si spray by Nasal route in the morning and 1 spray before bedtime.     Dispense:  1 each     Refill:  0          This is a specialized patient consultation in endocrinology and required comprehensive review of prior records, as well as current evaluation, with time required for consideration of complex endocrine issues and consultation. For this visit, I personally interviewed the patient, and family member if accompanied, performed the pertinent parts of the history and physical examination. ROS included screening for appropriate endocrine conditions.   Today's diagnosis and plan were reviewed in  detail with the patient who states understanding and agrees with plan. I discussed with the patient possible diagnosis, differential diagnosis, need for work up , treatment options, alternatives and side effects.     Please see note for details about time spent which includes:   · pre-visit preparation  · reviewing records  · face to face time with the patient   · timely documentation of the encounter  · ordering medications/tests  · communication with care team  · care coordination    I appreciate the opportunity to be part of your patient's medical care and will keep you, as the referring and primary physicians, informed about the care of your patient, including possible future surgery and pathology findings. Please feel free to contact me should you have any questions.      Melissa Rangel MD

## 2025-01-31 NOTE — PROGRESS NOTES
Name: cm benson  YOB: 1965  Report Period: 01/04/2025 - 01/31/2025 (28 days)  Generated: 01/31/2025  Time CGM Active: 98%      Glucose Statistics and Targets  Average Glucose: 160 mg/dL  Glucose Management Indicator (GMI): 7.1%  Glucose Variability (%CV): 24.5%  Target Range: 70 - 180 mg/dL      Time in Ranges  Very High: >250 mg/dL --- 3%  High: 181 - 250 mg/dL --- 23%  Target Range: 70 - 180 mg/dL --- 74%  Low: 54 - 69 mg/dL --- 0%  Very Low: <54 mg/dL --- 0%

## 2025-01-31 NOTE — TELEPHONE ENCOUNTER
Endocrine Refill protocol for oral medications    Protocol Criteria:  PASSED  Reason: N/A    If below requirement is met, send a 90-day supply with 1 refill per provider protocol.    Verify appointment with Endocrinology completed in the last 6 months or scheduled in the next 3 months.    Last completed office visit: 1/31/2025 Melissa Rangel MD   Next scheduled Follow up:   Future Appointments   Date Time Provider Department Center   3/28/2025  3:15 PM Melissa Rangel MD [facility]

## 2025-02-12 ENCOUNTER — TELEPHONE (OUTPATIENT)
Dept: ENDOCRINOLOGY CLINIC | Facility: CLINIC | Age: 60
End: 2025-02-12

## 2025-02-17 ENCOUNTER — PATIENT MESSAGE (OUTPATIENT)
Facility: CLINIC | Age: 60
End: 2025-02-17

## 2025-03-08 ENCOUNTER — LAB ENCOUNTER (OUTPATIENT)
Dept: LAB | Age: 60
End: 2025-03-08
Attending: INTERNAL MEDICINE
Payer: COMMERCIAL

## 2025-03-08 DIAGNOSIS — E11.21 DIABETIC NEPHROPATHY ASSOCIATED WITH TYPE 2 DIABETES MELLITUS (HCC): ICD-10-CM

## 2025-03-08 DIAGNOSIS — E11.65 UNCONTROLLED TYPE 2 DIABETES MELLITUS WITH HYPERGLYCEMIA (HCC): ICD-10-CM

## 2025-03-08 DIAGNOSIS — E66.812 CLASS 2 OBESITY WITH BODY MASS INDEX (BMI) OF 39.0 TO 39.9 IN ADULT, UNSPECIFIED OBESITY TYPE, UNSPECIFIED WHETHER SERIOUS COMORBIDITY PRESENT: ICD-10-CM

## 2025-03-08 DIAGNOSIS — R73.09 HIGH GLUCOSE LEVEL: ICD-10-CM

## 2025-03-08 DIAGNOSIS — E11.9 DIABETES MELLITUS TYPE 2, NONINSULIN DEPENDENT (HCC): ICD-10-CM

## 2025-03-08 DIAGNOSIS — E78.5 DYSLIPIDEMIA: ICD-10-CM

## 2025-03-08 DIAGNOSIS — E11.9 TYPE 2 DIABETES MELLITUS WITHOUT COMPLICATION, WITHOUT LONG-TERM CURRENT USE OF INSULIN (HCC): ICD-10-CM

## 2025-03-08 DIAGNOSIS — I10 ESSENTIAL HYPERTENSION, BENIGN: ICD-10-CM

## 2025-03-08 LAB
ALBUMIN SERPL-MCNC: 4.6 G/DL (ref 3.2–4.8)
ALBUMIN/GLOB SERPL: 1.8 {RATIO} (ref 1–2)
ALP LIVER SERPL-CCNC: 80 U/L
ALT SERPL-CCNC: 24 U/L
ANION GAP SERPL CALC-SCNC: 5 MMOL/L (ref 0–18)
AST SERPL-CCNC: 19 U/L (ref ?–34)
BILIRUB SERPL-MCNC: 0.7 MG/DL (ref 0.2–1.1)
BUN BLD-MCNC: 13 MG/DL (ref 9–23)
BUN/CREAT SERPL: 15.5 (ref 10–20)
CALCIUM BLD-MCNC: 9.6 MG/DL (ref 8.7–10.4)
CHLORIDE SERPL-SCNC: 104 MMOL/L (ref 98–112)
CHOLEST SERPL-MCNC: 141 MG/DL (ref ?–200)
CO2 SERPL-SCNC: 29 MMOL/L (ref 21–32)
CREAT BLD-MCNC: 0.84 MG/DL
CREAT UR-SCNC: 153.8 MG/DL
EGFRCR SERPLBLD CKD-EPI 2021: 80 ML/MIN/1.73M2 (ref 60–?)
EST. AVERAGE GLUCOSE BLD GHB EST-MCNC: 171 MG/DL (ref 68–126)
FASTING PATIENT LIPID ANSWER: YES
FASTING STATUS PATIENT QL REPORTED: YES
GLOBULIN PLAS-MCNC: 2.5 G/DL (ref 2–3.5)
GLUCOSE BLD-MCNC: 152 MG/DL (ref 70–99)
HBA1C MFR BLD: 7.6 % (ref ?–5.7)
HDLC SERPL-MCNC: 59 MG/DL (ref 40–59)
LDLC SERPL CALC-MCNC: 63 MG/DL (ref ?–100)
MICROALBUMIN UR-MCNC: 0.6 MG/DL
MICROALBUMIN/CREAT 24H UR-RTO: 3.9 UG/MG (ref ?–30)
NONHDLC SERPL-MCNC: 82 MG/DL (ref ?–130)
OSMOLALITY SERPL CALC.SUM OF ELEC: 289 MOSM/KG (ref 275–295)
POTASSIUM SERPL-SCNC: 4.3 MMOL/L (ref 3.5–5.1)
PROT SERPL-MCNC: 7.1 G/DL (ref 5.7–8.2)
SODIUM SERPL-SCNC: 138 MMOL/L (ref 136–145)
TRIGL SERPL-MCNC: 102 MG/DL (ref 30–149)
TSI SER-ACNC: 1.67 UIU/ML (ref 0.55–4.78)
VLDLC SERPL CALC-MCNC: 15 MG/DL (ref 0–30)

## 2025-03-08 PROCEDURE — 80053 COMPREHEN METABOLIC PANEL: CPT

## 2025-03-08 PROCEDURE — 80061 LIPID PANEL: CPT

## 2025-03-08 PROCEDURE — 83036 HEMOGLOBIN GLYCOSYLATED A1C: CPT

## 2025-03-08 PROCEDURE — 36415 COLL VENOUS BLD VENIPUNCTURE: CPT

## 2025-03-08 PROCEDURE — 82043 UR ALBUMIN QUANTITATIVE: CPT

## 2025-03-08 PROCEDURE — 84443 ASSAY THYROID STIM HORMONE: CPT

## 2025-03-08 PROCEDURE — 82570 ASSAY OF URINE CREATININE: CPT

## 2025-04-06 DIAGNOSIS — E11.9 DIABETES MELLITUS TYPE 2, NONINSULIN DEPENDENT (HCC): ICD-10-CM

## 2025-04-06 DIAGNOSIS — E11.9 TYPE 2 DIABETES MELLITUS WITHOUT COMPLICATION, WITHOUT LONG-TERM CURRENT USE OF INSULIN (HCC): ICD-10-CM

## 2025-04-06 DIAGNOSIS — R73.09 HIGH GLUCOSE LEVEL: ICD-10-CM

## 2025-04-06 DIAGNOSIS — I10 ESSENTIAL HYPERTENSION, BENIGN: ICD-10-CM

## 2025-04-06 DIAGNOSIS — E11.65 UNCONTROLLED TYPE 2 DIABETES MELLITUS WITH HYPERGLYCEMIA (HCC): ICD-10-CM

## 2025-04-06 DIAGNOSIS — E11.21 DIABETIC NEPHROPATHY ASSOCIATED WITH TYPE 2 DIABETES MELLITUS (HCC): ICD-10-CM

## 2025-04-06 DIAGNOSIS — E78.5 DYSLIPIDEMIA: ICD-10-CM

## 2025-04-06 DIAGNOSIS — E66.812 CLASS 2 OBESITY WITH BODY MASS INDEX (BMI) OF 39.0 TO 39.9 IN ADULT, UNSPECIFIED OBESITY TYPE, UNSPECIFIED WHETHER SERIOUS COMORBIDITY PRESENT: ICD-10-CM

## 2025-04-07 RX ORDER — ACYCLOVIR 800 MG/1
1 TABLET ORAL
Qty: 6 EACH | Refills: 2 | Status: SHIPPED | OUTPATIENT
Start: 2025-04-07 | End: 2025-07-06

## 2025-04-07 NOTE — TELEPHONE ENCOUNTER
Endocrine Refill protocol for CGM supplies     Protocol Criteria:  PASSED Reason: N/A    If below requirement is met, send a 90-day supply with 1 refill per provider protocol.     Verify appointment with Endocrinology completed in the last 12 months or scheduled in the next 6 months     Last completed office visit:1/31/2025 Melissa Rangel MD   Next scheduled Follow up:   Future Appointments   Date Time Provider Department Center   4/25/2025 10:15 AM Melissa Rangel MD [facility]

## 2025-04-25 ENCOUNTER — OFFICE VISIT (OUTPATIENT)
Facility: CLINIC | Age: 60
End: 2025-04-25

## 2025-04-25 VITALS
BODY MASS INDEX: 40.29 KG/M2 | SYSTOLIC BLOOD PRESSURE: 112 MMHG | WEIGHT: 236 LBS | DIASTOLIC BLOOD PRESSURE: 74 MMHG | HEART RATE: 64 BPM | HEIGHT: 64 IN

## 2025-04-25 DIAGNOSIS — E66.813 CLASS 3 SEVERE OBESITY WITH BODY MASS INDEX (BMI) OF 40.0 TO 44.9 IN ADULT, UNSPECIFIED OBESITY TYPE, UNSPECIFIED WHETHER SERIOUS COMORBIDITY PRESENT: ICD-10-CM

## 2025-04-25 DIAGNOSIS — E11.65 UNCONTROLLED TYPE 2 DIABETES MELLITUS WITH HYPERGLYCEMIA (HCC): Primary | ICD-10-CM

## 2025-04-25 DIAGNOSIS — I10 ESSENTIAL HYPERTENSION, BENIGN: ICD-10-CM

## 2025-04-25 DIAGNOSIS — K21.9 GASTROESOPHAGEAL REFLUX DISEASE, UNSPECIFIED WHETHER ESOPHAGITIS PRESENT: ICD-10-CM

## 2025-04-25 DIAGNOSIS — R73.09 HIGH GLUCOSE LEVEL: ICD-10-CM

## 2025-04-25 DIAGNOSIS — E11.9 TYPE 2 DIABETES MELLITUS WITHOUT COMPLICATION, WITHOUT LONG-TERM CURRENT USE OF INSULIN (HCC): ICD-10-CM

## 2025-04-25 DIAGNOSIS — E78.5 DYSLIPIDEMIA: ICD-10-CM

## 2025-04-25 DIAGNOSIS — E11.21 DIABETIC NEPHROPATHY ASSOCIATED WITH TYPE 2 DIABETES MELLITUS (HCC): ICD-10-CM

## 2025-04-25 LAB — HEMOGLOBIN A1C: 7.4 % (ref 4.3–5.6)

## 2025-04-25 PROCEDURE — 3074F SYST BP LT 130 MM HG: CPT | Performed by: INTERNAL MEDICINE

## 2025-04-25 PROCEDURE — 3078F DIAST BP <80 MM HG: CPT | Performed by: INTERNAL MEDICINE

## 2025-04-25 PROCEDURE — 3051F HG A1C>EQUAL 7.0%<8.0%: CPT | Performed by: INTERNAL MEDICINE

## 2025-04-25 PROCEDURE — 99214 OFFICE O/P EST MOD 30 MIN: CPT | Performed by: INTERNAL MEDICINE

## 2025-04-25 PROCEDURE — 3008F BODY MASS INDEX DOCD: CPT | Performed by: INTERNAL MEDICINE

## 2025-04-25 PROCEDURE — 3061F NEG MICROALBUMINURIA REV: CPT | Performed by: INTERNAL MEDICINE

## 2025-04-25 PROCEDURE — 83036 HEMOGLOBIN GLYCOSYLATED A1C: CPT | Performed by: INTERNAL MEDICINE

## 2025-04-25 RX ORDER — INSULIN GLARGINE 100 [IU]/ML
30 INJECTION, SOLUTION SUBCUTANEOUS NIGHTLY
Qty: 30 ML | Refills: 1 | Status: SHIPPED | OUTPATIENT
Start: 2025-04-25 | End: 2025-10-22

## 2025-04-25 RX ORDER — ROSUVASTATIN CALCIUM 20 MG/1
20 TABLET, COATED ORAL NIGHTLY
Qty: 90 TABLET | Refills: 1 | Status: SHIPPED | OUTPATIENT
Start: 2025-04-25

## 2025-04-25 RX ORDER — ACYCLOVIR 800 MG/1
1 TABLET ORAL
Qty: 6 EACH | Refills: 2 | Status: SHIPPED | OUTPATIENT
Start: 2025-04-25 | End: 2025-07-24

## 2025-04-25 RX ORDER — SEMAGLUTIDE 2.68 MG/ML
2 INJECTION, SOLUTION SUBCUTANEOUS WEEKLY
Qty: 9 ML | Refills: 1 | Status: SHIPPED | OUTPATIENT
Start: 2025-04-25

## 2025-04-25 NOTE — PROGRESS NOTES
Reason for Visit:  uncontrolled DM with nephropathy, hyperglycemia, HTN, DLP    Requesting Physician:   .Jeronimo Cam MD      CHIEF COMPLAINT:    Chief Complaint   Patient presents with    Diabetes     F/u        HISTORY OF PRESENT ILLNESS:   Irish Wolf is a 59 year old female who presents with  uncontrolled DM with nephropathy, hyperglycemia, HTN, DLP, and obesity.     Schedule this appt to discuss SE from Meds.   She has cough and hoarseness in the middle of the conversation        Was seen with brother and sister   She is working out but has no energy   She gained wt   She is eating more carbs   Sister suggested pancreas transplant to help her . Sister is a retired nurse      Did not tolerate MTF     We discussed CGM data and insulin doses.         t2DM Dx  > 5yrs ago per Pt    She is on   Ozempic 2  mg/week   Lantus 28  units daily   Novolog with meals  12 units with  meals      Stopped MTD d/t SE     Had local reaction and SOB from trulicity   Could not get mounjaro    Stopped Jardiance d/t yeast infection sx   Was on glipizide     . 14 day CGM data showed      TIR 73  %  high 26 %  low %  very high 1  %  Very low   %    Discussed her readings, diet and wt  DLP Cholesterol Meds: rosuvastatin Tabs - 20 MG     HTN BP Meds: valsartan-hydroCHLOROthiazide Tabs - 80-12.5 MG           DM quality measures:  A1C/Blood pressure: as reported above   Nephropathy screenin/24  continue ace /arb rx.   LIPID screenin2023  . on statin rx.   Last dilated eye exam: Last Dilated Eye Exam: 24     Exam shows retinopathy? Eye Exam shows Diabetic Retinopathy?: No    Last diabetic foot exam: No data recorded decreased sensation in Lt Large toe 2025   Dentist : recommend every 6m  Neuropathy:  no  CAD/ASCVD/PAD/CVA: no   Cholesterol Meds: rosuvastatin Tabs - 20 MG   Cholesterol: 141, done on 3/8/2025.  HDL Cholesterol: 59, done on 3/8/2025.  LDL Cholesterol: 63, done on 3/8/2025.  TriGlycerides 102, done on  3/8/2025.    Wt Readings from Last 6 Encounters:   04/25/25 236 lb (107 kg)   01/31/25 232 lb (105.2 kg)   12/27/24 228 lb (103.4 kg)   09/20/24 230 lb (104.3 kg)   07/26/24 228 lb (103.4 kg)   06/07/24 224 lb 6.4 oz (101.8 kg)        Micro Albumen/Creatinine:    Lab Results   Component Value Date    MICROALBCREA 3.9 03/08/2025    MICROALBCREA  04/05/2024      Comment:      Unable to calculate due to Urine Microalbumin <0.3 mg/dL        MICROALBCREA 31.7 (H) 08/08/2023         ASSESSMENT AND PLAN:  59 year old  woman  uncontrolled DM with nephropathy, hyperglycemia, neuropathy, nephropathy HTN, DLP, and obesity.  Had CDE consult   Getting post prandial hyperglycemia  after Bbrkfst and dinner     Will avoid SGLT2i d/t yeast infection   D/w pt hypo and hyperglycemia      PPI helped with raspy voice and GERD symptoms    Plan     Follow up in 3 mo   Lantus 28 units daily   Novolog with meals 12 units  meals    Take 7 with smaller meals or will go for a walk after.  Ok to skip this dose if will skip a meal .     Stop metformin    Cholesterol Meds: rosuvastatin Tabs - 20 MG     BP Meds: valsartan-hydroCHLOROthiazide Tabs - 80-12.5 MG     Walk 180 min/week   Limit cabrs  Check blood sugar fasting, before meals and before bedtime.   If you have low blood sugar <70, take 15 grams of carb (8 oz juice or regular soda) and recheck in 15 minutes.    Follow up with podiatry and eye doctor annually.   Patients need to wear covered shoes all the time and check feet daily.   Bring your meter/BG log to the next visit.      Target A1c 6.5%  Target BG   BEFORE MEAL 100-130mg/dl  2hrs AFTER MEAL less than 180mg/dl    We discussed these in detail with the patient and negotiated which of numerous possible changes in the in the treatment plan that would be acceptable to them. The patient remains at ongoing is at high risk for complications related to uncontrolled diabetes and treatment.  The patient requires a great deal of self-management  and support. We expect the patient's risk to be reduced with the changes to the treatment plan that we recommended today.   We reviewed a very large amount of complicated data including BG target range, basal insulin doses, meal   related insulin boluses, time of meal, size of meal, time of BG testing and insulin administration in relations to meals. We also reviewed self-testing BG results if available.         PAST MEDICAL HISTORY:   Past Medical History:    Diabetes (HCC)    Diverticulitis    Hernia    HTN (hypertension)    Hyperlipidemia       PAST SURGICAL HISTORY:   Past Surgical History:   Procedure Laterality Date    Hernia surgery      Other surgical history      Colectomy with anastomosis       CURRENT MEDICATIONS:     semaglutide (OZEMPIC, 2 MG/DOSE,) 8 MG/3ML Subcutaneous Solution Pen-injector Inject 2 mg into the skin once a week. 9 mL 1    rosuvastatin 20 MG Oral Tab Take 1 tablet (20 mg total) by mouth nightly. 90 tablet 1    insulin glargine (LANTUS SOLOSTAR) 100 UNIT/ML Subcutaneous Solution Pen-injector Inject 30 Units into the skin nightly. 30 mL 1    insulin aspart 100 Units/mL Subcutaneous Solution Pen-injector Inject 14 Units into the skin 3 (three) times daily before meals. 45 mL 1    Continuous Glucose Sensor (FREESTYLE AKANKSHA 3 SENSOR) Does not apply Misc 1 each every 14 (fourteen) days. 6 each 2         ALLERGIES:  No Known Allergies    SOCIAL HISTORY:    Social History     Socioeconomic History    Marital status: Single   Tobacco Use    Smoking status: Former     Current packs/day: 0.00     Average packs/day: 0.5 packs/day for 30.0 years (15.0 ttl pk-yrs)     Types: Cigarettes     Start date: 10/1/1990     Quit date: 10/1/2020     Years since quittin.5    Smokeless tobacco: Never   Vaping Use    Vaping status: Never Used   Substance and Sexual Activity    Alcohol use: Never    Drug use: Never       FAMILY HISTORY:   Family History   Problem Relation Age of Onset    Heart Disorder Father          chf    Psychiatric Mother         parkinsons    Diabetes Mother     Dementia Mother    DM in the family ++          PHYSICAL EXAM:   Height: 5' 4\" (162.6 cm) (04/25 0937)  Weight: 236 lb (107 kg) (04/25 0937)  BSA (Calculated - sq m): 2.1 sq meters (04/25 0937)  Pulse: 64 (04/25 0937)  BP: 143/80 (04/25 0937)  Temp: --  Do Not Use - Resp Rate: --  SpO2: --    Body mass index is 40.51 kg/m².  Obese        CONSTITUTIONAL:  Awake and alert. Age appropriate, good hygiene not in acute distress. Well nourished and well developed. no acute distress   PSYCH:   Orientated to time, place, person & situation, Normal mood and affect, memory intact, normal insight and judgment, cooperative      DATA:     Pertinent data reviewed      Latest Reference Range & Units 12/27/24 07:14   Cholesterol, Total <200 mg/dL 145   Triglycerides 30 - 149 mg/dL 140   HDL Cholesterol 40 - 59 mg/dL 59   VLDL 0 - 30 mg/dL 21   NON-HDL CHOLESTEROL <130 mg/dL 86   LDL Cholesterol Calc <100 mg/dL 62   Patient Fasting for Lipid?  Yes   TSH 0.550 - 4.780 uIU/mL 1.704         Latest Reference Range & Units 08/08/23 07:50 04/05/24 08:12   CREATININE UR RANDOM mg/dL 24.90 20.50   MALB URINE mg/dL 0.79 <0.30   MALB/CRE CALC <=30.0 ug/mg 31.7 (H) See chart for results   (H): Data is abnormally high    Lab Results   Component Value Date    A1C 7.6 (H) 03/08/2025    A1C 7.5 (A) 12/27/2024    A1C 7.7 (H) 12/27/2024    A1C 7.0 (A) 09/20/2024    A1C 8.5 (A) 05/10/2024          No results for input(s): \"TSH\", \"T4F\", \"T3F\", \"THYP\" in the last 72 hours.    No results found.            Orders Placed This Encounter   Procedures    POC Glycohemoglobin [87162]     Orders Placed This Encounter    POC Glycohemoglobin [43115]     Release to patient:   Immediate    semaglutide (OZEMPIC, 2 MG/DOSE,) 8 MG/3ML Subcutaneous Solution Pen-injector     Sig: Inject 2 mg into the skin once a week.     Dispense:  9 mL     Refill:  1    rosuvastatin 20 MG Oral Tab     Sig: Take  1 tablet (20 mg total) by mouth nightly.     Dispense:  90 tablet     Refill:  1    insulin glargine (LANTUS SOLOSTAR) 100 UNIT/ML Subcutaneous Solution Pen-injector     Sig: Inject 30 Units into the skin nightly.     Dispense:  30 mL     Refill:  1    insulin aspart 100 Units/mL Subcutaneous Solution Pen-injector     Sig: Inject 14 Units into the skin 3 (three) times daily before meals.     Dispense:  45 mL     Refill:  1     Patient will also need insulin pen needles    Continuous Glucose Sensor (FREESTYLE AKANKSHA 3 SENSOR) Does not apply Misc     Si each every 14 (fourteen) days.     Dispense:  6 each     Refill:  2          This is a specialized patient consultation in endocrinology and required comprehensive review of prior records, as well as current evaluation, with time required for consideration of complex endocrine issues and consultation. For this visit, I personally interviewed the patient, and family member if accompanied, performed the pertinent parts of the history and physical examination. ROS included screening for appropriate endocrine conditions.   Today's diagnosis and plan were reviewed in detail with the patient who states understanding and agrees with plan. I discussed with the patient possible diagnosis, differential diagnosis, need for work up , treatment options, alternatives and side effects.     Please see note for details about time spent which includes:   · pre-visit preparation  · reviewing records  · face to face time with the patient   · timely documentation of the encounter  · ordering medications/tests  · communication with care team  · care coordination    I appreciate the opportunity to be part of your patient's medical care and will keep you, as the referring and primary physicians, informed about the care of your patient, including possible future surgery and pathology findings. Please feel free to contact me should you have any questions.      Melissa Rangel MD

## 2025-04-25 NOTE — PATIENT INSTRUCTIONS
Wt Readings from Last 6 Encounters:   04/25/25 236 lb (107 kg)   01/31/25 232 lb (105.2 kg)   12/27/24 228 lb (103.4 kg)   09/20/24 230 lb (104.3 kg)   07/26/24 228 lb (103.4 kg)   06/07/24 224 lb 6.4 oz (101.8 kg)     4/25/2025  A1c: 7.4  Lab Results   Component Value Date    A1C 7.6 (H) 03/08/2025    A1C 7.5 (A) 12/27/2024    A1C 7.7 (H) 12/27/2024    A1C 7.0 (A) 09/20/2024    A1C 8.5 (A) 05/10/2024        Follow up in 3 mo   Lantus 28 units daily   Novolog with meals 12 units  meals    Take 7 with smaller meals or will go for a walk after.  Ok to skip this dose if will skip a meal .     Stop metformin    Cholesterol Meds: rosuvastatin Tabs - 20 MG     BP Meds: valsartan-hydroCHLOROthiazide Tabs - 80-12.5 MG     Walk 180 min/week   Limit cabrs  Check blood sugar fasting, before meals and before bedtime.   If you have low blood sugar <70, take 15 grams of carb (8 oz juice or regular soda) and recheck in 15 minutes.    Follow up with podiatry and eye doctor annually.   Patients need to wear covered shoes all the time and check feet daily.   Bring your meter/BG log to the next visit.      Target A1c 6.5%  Target BG   BEFORE MEAL 100-130mg/dl  2hrs AFTER MEAL less than 180mg/dl

## 2025-06-20 ENCOUNTER — OFFICE VISIT (OUTPATIENT)
Dept: INTERNAL MEDICINE CLINIC | Facility: CLINIC | Age: 60
End: 2025-06-20

## 2025-06-20 VITALS
HEIGHT: 64 IN | HEART RATE: 77 BPM | SYSTOLIC BLOOD PRESSURE: 120 MMHG | BODY MASS INDEX: 40.46 KG/M2 | DIASTOLIC BLOOD PRESSURE: 76 MMHG | OXYGEN SATURATION: 96 % | WEIGHT: 237 LBS

## 2025-06-20 DIAGNOSIS — E11.9 TYPE 2 DIABETES MELLITUS WITHOUT COMPLICATION, WITHOUT LONG-TERM CURRENT USE OF INSULIN (HCC): ICD-10-CM

## 2025-06-20 DIAGNOSIS — L91.8 MULTIPLE ACQUIRED SKIN TAGS: Primary | ICD-10-CM

## 2025-06-20 LAB — HEMOGLOBIN A1C: 7.2 % (ref 4.3–5.6)

## 2025-06-20 PROCEDURE — 83036 HEMOGLOBIN GLYCOSYLATED A1C: CPT | Performed by: NURSE PRACTITIONER

## 2025-06-20 PROCEDURE — 3078F DIAST BP <80 MM HG: CPT | Performed by: NURSE PRACTITIONER

## 2025-06-20 PROCEDURE — 99213 OFFICE O/P EST LOW 20 MIN: CPT | Performed by: NURSE PRACTITIONER

## 2025-06-20 PROCEDURE — 3051F HG A1C>EQUAL 7.0%<8.0%: CPT | Performed by: NURSE PRACTITIONER

## 2025-06-20 PROCEDURE — 3074F SYST BP LT 130 MM HG: CPT | Performed by: NURSE PRACTITIONER

## 2025-06-20 PROCEDURE — 3008F BODY MASS INDEX DOCD: CPT | Performed by: NURSE PRACTITIONER

## 2025-06-20 NOTE — PATIENT INSTRUCTIONS
Try Aquaphor ointment or a small amount of zinc based ointment (diaper cream) to the area to protect and promote healing.  Utilize hypoallergenic/non-perfumed wipes for less friction with wiping.  Your A1C was 7.2

## 2025-06-20 NOTE — PROGRESS NOTES
The following individual(s) verbally consented to be recorded using ambient AI listening technology and understand that they can each withdraw their consent to this listening technology at any point by asking the clinician to turn off or pause the recording:    Patient name: Irish Wolf  Additional names:  n/a

## 2025-06-20 NOTE — PROGRESS NOTES
Subjective:   Irish Wolf is a 59 year old female with PMH T2DM, HTN who presents for Anal / Rectal Problem (Discomfort and bleeding near rectal area)     History of Present Illness  Irish Wolf is a 59 year old female with diabetes who presents with concerns of weight gain and rectal bleeding.    She manages her diabetes with four daily insulin injections and a weekly dose of Ozempic on Sundays. Despite these efforts, she has experienced significant weight gain, approximately 35 pounds, which she finds perplexing as she maintains a diet of five small meals a day and exercises regularly. Her previous weight was 200 pounds. She questions whether insulin resistance or menopause might be contributing factors. She has been under tremendous stress recently helping to manage her brother's medical issues.    She experiences spots of light pink blood on toilet paper, particularly after exercise or bowel movements. The bleeding is light and occurs when wiping. She suspects an anal tear rather than hemorrhoids. She has been using various topical treatments, including Preparation H, Vaseline, and triple antibiotic ointment, but reports no significant relief. The area burns when applying ointment. No significant pain is associated with bowel movements.    Her bowel movements are regular and soft, which she attributes to her insulin use. She had a colonoscopy in 2020 with a ten-year recall. No abdominal pain, nausea, vomiting, diarrhea, constipation. No dizziness or lightheadedness. No recent changes in diet or exercise habits.    Her diet consists of small meals aimed at stabilizing her blood sugar levels, including items like waffles with peanut butter, strawberries, pears, and carrots. She avoids fast food and cooks at home, focusing on portion control. Despite these efforts, she feels like she is 'a big blob'.    She has skin tags, particularly around her bra line, which she attributes to possible hormonal changes. These  skin tags are bothersome due to their location and friction with clothing.    Wt Readings from Last 6 Encounters:   06/20/25 237 lb (107.5 kg)   04/25/25 236 lb (107 kg)   01/31/25 232 lb (105.2 kg)   12/27/24 228 lb (103.4 kg)   09/20/24 230 lb (104.3 kg)   07/26/24 228 lb (103.4 kg)           History/Other:    Chief Complaint Reviewed and Verified  Nursing Notes Reviewed and   Verified  Tobacco Reviewed  Allergies Reviewed  Medications Reviewed    Problem List Reviewed  Medical History Reviewed  Surgical History   Reviewed  OB Status Reviewed  Family History Reviewed         Tobacco:  She smoked tobacco in the past but quit greater than 12 months ago.  Tobacco Use[1]     Current Medications[2]      Review of Systems:  Review of Systems  10 point review of systems otherwise negative with the exception of HPI and assessment and plan.    Objective:   /76   Pulse 77   Ht 5' 4\" (1.626 m)   Wt 237 lb (107.5 kg)   LMP  (LMP Unknown)   SpO2 96%   BMI 40.68 kg/m²  Estimated body mass index is 40.68 kg/m² as calculated from the following:    Height as of this encounter: 5' 4\" (1.626 m).    Weight as of this encounter: 237 lb (107.5 kg).      Physical Exam  Vitals reviewed.   Cardiovascular:      Rate and Rhythm: Normal rate.   Pulmonary:      Effort: Pulmonary effort is normal. No respiratory distress.   Abdominal:      Palpations: Abdomen is soft.      Comments: 2 tiny approx 2mm fissures to R side of rectum. No active bleeding.   Skin:     General: Skin is warm and dry.   Neurological:      Mental Status: She is alert.         Assessment & Plan:   1. Multiple acquired skin tags  - Derm Referral - [facility] ([facility])    2. Type 2 diabetes mellitus without complication, without long-term current use of insulin ([facility])  - POC Glycohemoglobin [55475]    Assessment & Plan  Type 2 diabetes mellitus with insulin use  Suboptimal control with A1c of 7.2 in office today - improved from 7.6 in March. Weight gain  despite dietary efforts and exercise. Insulin may cause weight gain; Ozempic should decrease appetite. Normal thyroid and lipid panels. Stress may contribute to weight gain.  - Perform finger stick to check current A1c level.  - Educate on dietary modifications, emphasizing pairing proteins with vegetables and minimizing high-sugar fruits.    Anal fissure  Anal fissure with minor bleeding and discomfort, likely due to frequent wiping. No significant pain or hemorrhoids.  - Recommend use of Aquaphor ointment to protect the area.  - Advise use of sensitive skin wipes instead of toilet paper or soft washcloth and mild soap after bowel movements to reduce friction.  - Educate on gentle patting rather than wiping to avoid irritation.    Weight gain  Weight gain of 9 pounds over the past year, total gain of 35 pounds. Insulin use may contribute; Ozempic should counteract this. Stress and dietary habits may also play a role.  - Review dietary habits and suggest modifications, such as reducing high-sugar fruits and incorporating more vegetables and proteins.    Skin tags  Concerns about skin tags and potential hormonal influences. Discussed potential referral to dermatology for removal if desired.  - Place referral to dermatology for skin tag removal, valid for 12 months.    Follow-up  Plans to monitor diabetes control and address ongoing concerns.  - Monitor A1c levels and adjust treatment as necessary.          Return in about 3 months (around 2025) for full physical with myself or Dr Cam.    Lisa Pang, APRN, 2025, 8:25 AM        [1]   Social History  Tobacco Use   Smoking Status Former    Current packs/day: 0.00    Average packs/day: 0.5 packs/day for 30.0 years (15.0 ttl pk-yrs)    Types: Cigarettes    Start date: 10/1/1990    Quit date: 10/1/2020    Years since quittin.7   Smokeless Tobacco Never   [2]   Current Outpatient Medications   Medication Sig Dispense Refill    semaglutide (OZEMPIC, 2  MG/DOSE,) 8 MG/3ML Subcutaneous Solution Pen-injector Inject 2 mg into the skin once a week. 9 mL 1    rosuvastatin 20 MG Oral Tab Take 1 tablet (20 mg total) by mouth nightly. 90 tablet 1    insulin glargine (LANTUS SOLOSTAR) 100 UNIT/ML Subcutaneous Solution Pen-injector Inject 30 Units into the skin nightly. 30 mL 1    insulin aspart 100 Units/mL Subcutaneous Solution Pen-injector Inject 14 Units into the skin 3 (three) times daily before meals. 45 mL 1    Continuous Glucose Sensor (FREESTYLE AKANKSHA 3 SENSOR) Does not apply Misc 1 each every 14 (fourteen) days. 6 each 2    Azelastine-Fluticasone 137-50 MCG/ACT Nasal Suspension 1 spray by Nasal route in the morning and 1 spray before bedtime. 1 each 0    PANTOPRAZOLE 20 MG Oral Tab EC TAKE 1 TABLET(20 MG) BY MOUTH TWICE DAILY BEFORE MEALS 180 tablet 0    valsartan-hydroCHLOROthiazide 80-12.5 MG Oral Tab Take 1 tablet by mouth daily. 90 tablet 3

## 2025-08-01 ENCOUNTER — OFFICE VISIT (OUTPATIENT)
Facility: CLINIC | Age: 60
End: 2025-08-01

## 2025-08-01 VITALS
DIASTOLIC BLOOD PRESSURE: 73 MMHG | HEART RATE: 78 BPM | SYSTOLIC BLOOD PRESSURE: 130 MMHG | BODY MASS INDEX: 40.46 KG/M2 | HEIGHT: 64 IN | WEIGHT: 237 LBS

## 2025-08-01 DIAGNOSIS — E11.65 UNCONTROLLED TYPE 2 DIABETES MELLITUS WITH HYPERGLYCEMIA (HCC): ICD-10-CM

## 2025-08-01 DIAGNOSIS — I10 ESSENTIAL HYPERTENSION, BENIGN: ICD-10-CM

## 2025-08-01 DIAGNOSIS — R73.09 HIGH GLUCOSE LEVEL: ICD-10-CM

## 2025-08-01 DIAGNOSIS — E66.812 CLASS 2 OBESITY WITH BODY MASS INDEX (BMI) OF 39.0 TO 39.9 IN ADULT, UNSPECIFIED OBESITY TYPE, UNSPECIFIED WHETHER SERIOUS COMORBIDITY PRESENT: ICD-10-CM

## 2025-08-01 DIAGNOSIS — E78.5 DYSLIPIDEMIA: ICD-10-CM

## 2025-08-01 DIAGNOSIS — E11.9 TYPE 2 DIABETES MELLITUS WITHOUT COMPLICATION, WITHOUT LONG-TERM CURRENT USE OF INSULIN (HCC): ICD-10-CM

## 2025-08-01 DIAGNOSIS — E11.9 DIABETES MELLITUS TYPE 2, NONINSULIN DEPENDENT (HCC): ICD-10-CM

## 2025-08-01 DIAGNOSIS — E11.21 DIABETIC NEPHROPATHY ASSOCIATED WITH TYPE 2 DIABETES MELLITUS (HCC): ICD-10-CM

## 2025-08-01 PROCEDURE — 3075F SYST BP GE 130 - 139MM HG: CPT | Performed by: INTERNAL MEDICINE

## 2025-08-01 PROCEDURE — 99214 OFFICE O/P EST MOD 30 MIN: CPT | Performed by: INTERNAL MEDICINE

## 2025-08-01 PROCEDURE — 3078F DIAST BP <80 MM HG: CPT | Performed by: INTERNAL MEDICINE

## 2025-08-01 PROCEDURE — 3008F BODY MASS INDEX DOCD: CPT | Performed by: INTERNAL MEDICINE

## 2025-08-01 RX ORDER — INSULIN GLARGINE 100 [IU]/ML
30 INJECTION, SOLUTION SUBCUTANEOUS NIGHTLY
Qty: 30 ML | Refills: 1 | Status: SHIPPED | OUTPATIENT
Start: 2025-08-01 | End: 2026-01-28

## 2025-08-01 RX ORDER — BLOOD SUGAR DIAGNOSTIC
1 STRIP MISCELLANEOUS 4 TIMES DAILY
Qty: 100 EACH | Refills: 2 | Status: SHIPPED | OUTPATIENT
Start: 2025-08-01 | End: 2025-10-30

## 2025-08-01 RX ORDER — VALSARTAN AND HYDROCHLOROTHIAZIDE 80; 12.5 MG/1; MG/1
1 TABLET, FILM COATED ORAL DAILY
Qty: 90 TABLET | Refills: 3 | Status: SHIPPED | OUTPATIENT
Start: 2025-08-01

## 2025-08-01 RX ORDER — ACYCLOVIR 800 MG/1
1 TABLET ORAL
Qty: 6 EACH | Refills: 2 | Status: SHIPPED | OUTPATIENT
Start: 2025-08-01 | End: 2025-10-30

## 2025-08-01 RX ORDER — ROSUVASTATIN CALCIUM 20 MG/1
20 TABLET, COATED ORAL NIGHTLY
Qty: 90 TABLET | Refills: 1 | Status: SHIPPED | OUTPATIENT
Start: 2025-08-01

## 2025-08-01 RX ORDER — INSULIN ASPART 100 [IU]/ML
14 INJECTION, SOLUTION INTRAVENOUS; SUBCUTANEOUS
Qty: 15 ML | Refills: 0 | Status: SHIPPED | OUTPATIENT
Start: 2025-08-01

## 2025-08-01 RX ORDER — TIRZEPATIDE 2.5 MG/.5ML
2.5 INJECTION, SOLUTION SUBCUTANEOUS
Qty: 2 ML | Refills: 3 | Status: SHIPPED | OUTPATIENT
Start: 2025-08-01

## 2025-08-01 RX ORDER — SEMAGLUTIDE 2.68 MG/ML
2 INJECTION, SOLUTION SUBCUTANEOUS WEEKLY
Qty: 9 ML | Refills: 1 | Status: SHIPPED | OUTPATIENT
Start: 2025-08-01